# Patient Record
Sex: MALE | Race: WHITE | NOT HISPANIC OR LATINO | URBAN - METROPOLITAN AREA
[De-identification: names, ages, dates, MRNs, and addresses within clinical notes are randomized per-mention and may not be internally consistent; named-entity substitution may affect disease eponyms.]

---

## 2017-07-03 ENCOUNTER — INPATIENT (INPATIENT)
Facility: HOSPITAL | Age: 62
LOS: 2 days | Discharge: HOME | DRG: 392 | End: 2017-07-06
Attending: SURGERY | Admitting: SURGERY
Payer: COMMERCIAL

## 2017-07-03 VITALS
RESPIRATION RATE: 12 BRPM | TEMPERATURE: 98 F | SYSTOLIC BLOOD PRESSURE: 139 MMHG | DIASTOLIC BLOOD PRESSURE: 86 MMHG | WEIGHT: 173.06 LBS | OXYGEN SATURATION: 96 % | HEART RATE: 83 BPM

## 2017-07-03 DIAGNOSIS — K57.32 DIVERTICULITIS OF LARGE INTESTINE WITHOUT PERFORATION OR ABSCESS WITHOUT BLEEDING: ICD-10-CM

## 2017-07-03 LAB
ALBUMIN SERPL ELPH-MCNC: 3.9 G/DL — SIGNIFICANT CHANGE UP (ref 3.3–5)
ALP SERPL-CCNC: 99 U/L — SIGNIFICANT CHANGE UP (ref 40–120)
ALT FLD-CCNC: 32 U/L — SIGNIFICANT CHANGE UP (ref 12–78)
ANION GAP SERPL CALC-SCNC: 8 MMOL/L — SIGNIFICANT CHANGE UP (ref 5–17)
APPEARANCE UR: CLEAR — SIGNIFICANT CHANGE UP
AST SERPL-CCNC: 23 U/L — SIGNIFICANT CHANGE UP (ref 15–37)
BACTERIA # UR AUTO: ABNORMAL
BASOPHILS # BLD AUTO: 0.1 K/UL — SIGNIFICANT CHANGE UP (ref 0–0.2)
BASOPHILS NFR BLD AUTO: 0.6 % — SIGNIFICANT CHANGE UP (ref 0–2)
BILIRUB SERPL-MCNC: 1.1 MG/DL — SIGNIFICANT CHANGE UP (ref 0.2–1.2)
BILIRUB UR-MCNC: NEGATIVE — SIGNIFICANT CHANGE UP
BUN SERPL-MCNC: 13 MG/DL — SIGNIFICANT CHANGE UP (ref 7–23)
CALCIUM SERPL-MCNC: 9.1 MG/DL — SIGNIFICANT CHANGE UP (ref 8.5–10.1)
CHLORIDE SERPL-SCNC: 98 MMOL/L — SIGNIFICANT CHANGE UP (ref 96–108)
CO2 SERPL-SCNC: 32 MMOL/L — HIGH (ref 22–31)
COLOR SPEC: YELLOW — SIGNIFICANT CHANGE UP
CREAT SERPL-MCNC: 1 MG/DL — SIGNIFICANT CHANGE UP (ref 0.5–1.3)
DIFF PNL FLD: NEGATIVE — SIGNIFICANT CHANGE UP
EOSINOPHIL # BLD AUTO: 0.2 K/UL — SIGNIFICANT CHANGE UP (ref 0–0.5)
EOSINOPHIL NFR BLD AUTO: 2.1 % — SIGNIFICANT CHANGE UP (ref 0–6)
GLUCOSE SERPL-MCNC: 110 MG/DL — HIGH (ref 70–99)
GLUCOSE UR QL: NEGATIVE — SIGNIFICANT CHANGE UP
HCT VFR BLD CALC: 48.8 % — SIGNIFICANT CHANGE UP (ref 39–50)
HGB BLD-MCNC: 16.5 G/DL — SIGNIFICANT CHANGE UP (ref 13–17)
INR BLD: 1.21 RATIO — HIGH (ref 0.88–1.16)
KETONES UR-MCNC: NEGATIVE — SIGNIFICANT CHANGE UP
LEUKOCYTE ESTERASE UR-ACNC: ABNORMAL
LIDOCAIN IGE QN: 91 U/L — SIGNIFICANT CHANGE UP (ref 73–393)
LYMPHOCYTES # BLD AUTO: 1.6 K/UL — SIGNIFICANT CHANGE UP (ref 1–3.3)
LYMPHOCYTES # BLD AUTO: 13.6 % — SIGNIFICANT CHANGE UP (ref 13–44)
MCHC RBC-ENTMCNC: 31.9 PG — SIGNIFICANT CHANGE UP (ref 27–34)
MCHC RBC-ENTMCNC: 33.9 GM/DL — SIGNIFICANT CHANGE UP (ref 32–36)
MCV RBC AUTO: 94 FL — SIGNIFICANT CHANGE UP (ref 80–100)
MONOCYTES # BLD AUTO: 1 K/UL — HIGH (ref 0–0.9)
MONOCYTES NFR BLD AUTO: 8.7 % — SIGNIFICANT CHANGE UP (ref 1–9)
NEUTROPHILS # BLD AUTO: 8.9 K/UL — HIGH (ref 1.8–7.4)
NEUTROPHILS NFR BLD AUTO: 75.1 % — SIGNIFICANT CHANGE UP (ref 43–77)
NITRITE UR-MCNC: NEGATIVE — SIGNIFICANT CHANGE UP
PH UR: 7 — SIGNIFICANT CHANGE UP (ref 5–8)
PLATELET # BLD AUTO: 286 K/UL — SIGNIFICANT CHANGE UP (ref 150–400)
POTASSIUM SERPL-MCNC: 3.8 MMOL/L — SIGNIFICANT CHANGE UP (ref 3.5–5.3)
POTASSIUM SERPL-SCNC: 3.8 MMOL/L — SIGNIFICANT CHANGE UP (ref 3.5–5.3)
PROT SERPL-MCNC: 8.6 G/DL — HIGH (ref 6–8.3)
PROT UR-MCNC: NEGATIVE — SIGNIFICANT CHANGE UP
PROTHROM AB SERPL-ACNC: 13.2 SEC — HIGH (ref 9.8–12.7)
RBC # BLD: 5.19 M/UL — SIGNIFICANT CHANGE UP (ref 4.2–5.8)
RBC # FLD: 11.2 % — SIGNIFICANT CHANGE UP (ref 10.3–14.5)
SODIUM SERPL-SCNC: 138 MMOL/L — SIGNIFICANT CHANGE UP (ref 135–145)
SP GR SPEC: 1 — LOW (ref 1.01–1.02)
UROBILINOGEN FLD QL: NEGATIVE — SIGNIFICANT CHANGE UP
WBC # BLD: 11.9 K/UL — HIGH (ref 3.8–10.5)
WBC # FLD AUTO: 11.9 K/UL — HIGH (ref 3.8–10.5)
WBC UR QL: ABNORMAL

## 2017-07-03 PROCEDURE — 74177 CT ABD & PELVIS W/CONTRAST: CPT | Mod: 26

## 2017-07-03 PROCEDURE — 99285 EMERGENCY DEPT VISIT HI MDM: CPT

## 2017-07-03 RX ORDER — ONDANSETRON 8 MG/1
4 TABLET, FILM COATED ORAL EVERY 6 HOURS
Qty: 0 | Refills: 0 | Status: DISCONTINUED | OUTPATIENT
Start: 2017-07-03 | End: 2017-07-06

## 2017-07-03 RX ORDER — METRONIDAZOLE 500 MG
500 TABLET ORAL ONCE
Qty: 0 | Refills: 0 | Status: COMPLETED | OUTPATIENT
Start: 2017-07-03 | End: 2017-07-03

## 2017-07-03 RX ORDER — IOHEXOL 300 MG/ML
30 INJECTION, SOLUTION INTRAVENOUS ONCE
Qty: 0 | Refills: 0 | Status: COMPLETED | OUTPATIENT
Start: 2017-07-03 | End: 2017-07-03

## 2017-07-03 RX ORDER — SODIUM CHLORIDE 9 MG/ML
1000 INJECTION, SOLUTION INTRAVENOUS
Qty: 0 | Refills: 0 | Status: DISCONTINUED | OUTPATIENT
Start: 2017-07-03 | End: 2017-07-04

## 2017-07-03 RX ORDER — CIPROFLOXACIN LACTATE 400MG/40ML
400 VIAL (ML) INTRAVENOUS EVERY 12 HOURS
Qty: 0 | Refills: 0 | Status: DISCONTINUED | OUTPATIENT
Start: 2017-07-04 | End: 2017-07-05

## 2017-07-03 RX ORDER — CIPROFLOXACIN LACTATE 400MG/40ML
400 VIAL (ML) INTRAVENOUS ONCE
Qty: 0 | Refills: 0 | Status: COMPLETED | OUTPATIENT
Start: 2017-07-03 | End: 2017-07-03

## 2017-07-03 RX ORDER — MORPHINE SULFATE 50 MG/1
2 CAPSULE, EXTENDED RELEASE ORAL EVERY 4 HOURS
Qty: 0 | Refills: 0 | Status: DISCONTINUED | OUTPATIENT
Start: 2017-07-03 | End: 2017-07-06

## 2017-07-03 RX ORDER — METRONIDAZOLE 500 MG
500 TABLET ORAL EVERY 8 HOURS
Qty: 0 | Refills: 0 | Status: DISCONTINUED | OUTPATIENT
Start: 2017-07-04 | End: 2017-07-05

## 2017-07-03 RX ORDER — SODIUM CHLORIDE 9 MG/ML
1000 INJECTION INTRAMUSCULAR; INTRAVENOUS; SUBCUTANEOUS
Qty: 0 | Refills: 0 | Status: COMPLETED | OUTPATIENT
Start: 2017-07-03 | End: 2017-07-03

## 2017-07-03 RX ORDER — DIPHENHYDRAMINE HCL 50 MG
25 CAPSULE ORAL EVERY 6 HOURS
Qty: 0 | Refills: 0 | Status: DISCONTINUED | OUTPATIENT
Start: 2017-07-03 | End: 2017-07-06

## 2017-07-03 RX ADMIN — SODIUM CHLORIDE 1000 MILLILITER(S): 9 INJECTION INTRAMUSCULAR; INTRAVENOUS; SUBCUTANEOUS at 18:03

## 2017-07-03 RX ADMIN — Medication 200 MILLIGRAM(S): at 18:46

## 2017-07-03 RX ADMIN — IOHEXOL 30 MILLILITER(S): 300 INJECTION, SOLUTION INTRAVENOUS at 18:03

## 2017-07-03 RX ADMIN — SODIUM CHLORIDE 1000 MILLILITER(S): 9 INJECTION INTRAMUSCULAR; INTRAVENOUS; SUBCUTANEOUS at 19:36

## 2017-07-03 RX ADMIN — Medication 100 MILLIGRAM(S): at 18:45

## 2017-07-03 RX ADMIN — SODIUM CHLORIDE 100 MILLILITER(S): 9 INJECTION, SOLUTION INTRAVENOUS at 23:58

## 2017-07-03 NOTE — H&P ADULT - NSHPLABSRESULTS_GEN_ALL_CORE
16.5   11.9  )-----------( 286      ( 03 Jul 2017 18:16 )             48.8   07-03    138  |  98  |  13  ----------------------------<  110<H>  3.8   |  32<H>  |  1.00    Ca    9.1      03 Jul 2017 18:16    TPro  8.6<H>  /  Alb  3.9  /  TBili  1.1  /  DBili  x   /  AST  23  /  ALT  32  /  AlkPhos  99  07-03      CT sigmoid diverticulitis with microperforation

## 2017-07-03 NOTE — H&P ADULT - HISTORY OF PRESENT ILLNESS
pt is a 63 yo male with no significant medical hx presents with abdominal pain x 3-4 days.  PT states he was in his USOH until 4 days ago when he developed pain in his LLQ.  Pain was colicky and came around every 4 hrs, sharp in nature. No relieving or aggravating factors. Pain persisted and presented today with slight increase.  Denies any nausea/vomiting.  No fever/chills. Last BM this evening normal.  Never had colonoscopy

## 2017-07-03 NOTE — ED ADULT NURSE REASSESSMENT NOTE - NS ED NURSE REASSESS COMMENT FT1
Received patient from [facility]. CT scan +diverticulitis with microperfusion- pending GI surgeon consult. VSS. No c/o CP/SOB/N/V/D. No pain at this time

## 2017-07-03 NOTE — H&P ADULT - ASSESSMENT
63 yo male with diverticulitis with microperforation        -admit        -IV abx        -d/w pt will treat medically but if worsens poss surgical intervention needed        -needs outpt colonoscopy

## 2017-07-03 NOTE — ED PROVIDER NOTE - OBJECTIVE STATEMENT
62 male sent to ER by PMD for evaluation of abdominal pain. Patient states pain started 4 days ago, LLQ pain, non radiating, comes and goes, associated with loose stool, no vomiting, no diarrhea, no fever.

## 2017-07-04 LAB
ANION GAP SERPL CALC-SCNC: 7 MMOL/L — SIGNIFICANT CHANGE UP (ref 5–17)
BUN SERPL-MCNC: 12 MG/DL — SIGNIFICANT CHANGE UP (ref 7–23)
CALCIUM SERPL-MCNC: 8.8 MG/DL — SIGNIFICANT CHANGE UP (ref 8.5–10.1)
CHLORIDE SERPL-SCNC: 101 MMOL/L — SIGNIFICANT CHANGE UP (ref 96–108)
CO2 SERPL-SCNC: 31 MMOL/L — SIGNIFICANT CHANGE UP (ref 22–31)
CREAT SERPL-MCNC: 0.99 MG/DL — SIGNIFICANT CHANGE UP (ref 0.5–1.3)
GLUCOSE SERPL-MCNC: 146 MG/DL — HIGH (ref 70–99)
HCT VFR BLD CALC: 46.2 % — SIGNIFICANT CHANGE UP (ref 39–50)
HGB BLD-MCNC: 15.6 G/DL — SIGNIFICANT CHANGE UP (ref 13–17)
MAGNESIUM SERPL-MCNC: 2.1 MG/DL — SIGNIFICANT CHANGE UP (ref 1.6–2.6)
MCHC RBC-ENTMCNC: 31.9 PG — SIGNIFICANT CHANGE UP (ref 27–34)
MCHC RBC-ENTMCNC: 33.8 GM/DL — SIGNIFICANT CHANGE UP (ref 32–36)
MCV RBC AUTO: 94.5 FL — SIGNIFICANT CHANGE UP (ref 80–100)
PHOSPHATE SERPL-MCNC: 2.7 MG/DL — SIGNIFICANT CHANGE UP (ref 2.5–4.5)
PLATELET # BLD AUTO: 270 K/UL — SIGNIFICANT CHANGE UP (ref 150–400)
POTASSIUM SERPL-MCNC: 3.9 MMOL/L — SIGNIFICANT CHANGE UP (ref 3.5–5.3)
POTASSIUM SERPL-SCNC: 3.9 MMOL/L — SIGNIFICANT CHANGE UP (ref 3.5–5.3)
RBC # BLD: 4.89 M/UL — SIGNIFICANT CHANGE UP (ref 4.2–5.8)
RBC # FLD: 11.3 % — SIGNIFICANT CHANGE UP (ref 10.3–14.5)
SODIUM SERPL-SCNC: 139 MMOL/L — SIGNIFICANT CHANGE UP (ref 135–145)
WBC # BLD: 12.6 K/UL — HIGH (ref 3.8–10.5)
WBC # FLD AUTO: 12.6 K/UL — HIGH (ref 3.8–10.5)

## 2017-07-04 RX ORDER — ENOXAPARIN SODIUM 100 MG/ML
40 INJECTION SUBCUTANEOUS DAILY
Qty: 0 | Refills: 0 | Status: DISCONTINUED | OUTPATIENT
Start: 2017-07-04 | End: 2017-07-06

## 2017-07-04 RX ORDER — DEXTROSE MONOHYDRATE, SODIUM CHLORIDE, AND POTASSIUM CHLORIDE 50; .745; 4.5 G/1000ML; G/1000ML; G/1000ML
1000 INJECTION, SOLUTION INTRAVENOUS
Qty: 0 | Refills: 0 | Status: DISCONTINUED | OUTPATIENT
Start: 2017-07-04 | End: 2017-07-05

## 2017-07-04 RX ADMIN — DEXTROSE MONOHYDRATE, SODIUM CHLORIDE, AND POTASSIUM CHLORIDE 50 MILLILITER(S): 50; .745; 4.5 INJECTION, SOLUTION INTRAVENOUS at 15:49

## 2017-07-04 RX ADMIN — Medication 100 MILLIGRAM(S): at 02:00

## 2017-07-04 RX ADMIN — SODIUM CHLORIDE 100 MILLILITER(S): 9 INJECTION, SOLUTION INTRAVENOUS at 11:55

## 2017-07-04 RX ADMIN — DEXTROSE MONOHYDRATE, SODIUM CHLORIDE, AND POTASSIUM CHLORIDE 50 MILLILITER(S): 50; .745; 4.5 INJECTION, SOLUTION INTRAVENOUS at 15:52

## 2017-07-04 RX ADMIN — Medication 200 MILLIGRAM(S): at 17:43

## 2017-07-04 RX ADMIN — MORPHINE SULFATE 2 MILLIGRAM(S): 50 CAPSULE, EXTENDED RELEASE ORAL at 18:02

## 2017-07-04 RX ADMIN — Medication 100 MILLIGRAM(S): at 18:49

## 2017-07-04 RX ADMIN — Medication 200 MILLIGRAM(S): at 05:17

## 2017-07-04 RX ADMIN — Medication 25 MILLIGRAM(S): at 00:17

## 2017-07-04 RX ADMIN — MORPHINE SULFATE 2 MILLIGRAM(S): 50 CAPSULE, EXTENDED RELEASE ORAL at 17:47

## 2017-07-04 RX ADMIN — Medication 100 MILLIGRAM(S): at 11:55

## 2017-07-04 NOTE — PROGRESS NOTE ADULT - SUBJECTIVE AND OBJECTIVE BOX
Subjective: pt still with abdominal pain, without new complaints.    Objective:  Vital Signs Last 24 Hrs  T(C): 36.8 (04 Jul 2017 08:13), Max: 36.9 (03 Jul 2017 17:22)  T(F): 98.2 (04 Jul 2017 08:13), Max: 98.4 (03 Jul 2017 17:22)  HR: 68 (04 Jul 2017 08:13) (65 - 83)  BP: 129/80 (04 Jul 2017 08:13) (129/80 - 157/81)  BP(mean): --  RR: 17 (04 Jul 2017 08:13) (12 - 17)  SpO2: 96% (04 Jul 2017 08:13) (94% - 96%)    Heent: ZIGGY, FREOM  Pul: clear  Cor: RSR, without murmurs  Abdomen: Normal bowel sounds, without distension, with mild lower abdominal tenderness.  Extremities: without edema, motor/sensory intact, without calf pain, Homans sign negative.                        15.6   12.6  )-----------( 270      ( 04 Jul 2017 08:42 )             46.2       07-04    139  |  101  |  12  ----------------------------<  146<H>  3.9   |  31  |  0.99    Ca    8.8      04 Jul 2017 08:42  Phos  2.7     07-04  Mg     2.1     07-04    TPro  8.6<H>  /  Alb  3.9  /  TBili  1.1  /  DBili  x   /  AST  23  /  ALT  32  /  AlkPhos  99  07-03 07-03 @ 07:01  -  07-04 @ 07:00  --------------------------------------------------------  IN:    lactated ringers.: 500 mL    Solution: 200 mL    Solution: 100 mL  Total IN: 800 mL    OUT:    Voided: 450 mL  Total OUT: 450 mL    Total NET: 350 mL      07-04 @ 07:01  -  07-04 @ 13:33  --------------------------------------------------------  IN:  Total IN: 0 mL    OUT:    Voided: 700 mL  Total OUT: 700 mL    Total NET: -700 mL

## 2017-07-05 LAB
ANION GAP SERPL CALC-SCNC: 6 MMOL/L — SIGNIFICANT CHANGE UP (ref 5–17)
BUN SERPL-MCNC: 9 MG/DL — SIGNIFICANT CHANGE UP (ref 7–23)
CALCIUM SERPL-MCNC: 8.6 MG/DL — SIGNIFICANT CHANGE UP (ref 8.5–10.1)
CHLORIDE SERPL-SCNC: 102 MMOL/L — SIGNIFICANT CHANGE UP (ref 96–108)
CO2 SERPL-SCNC: 33 MMOL/L — HIGH (ref 22–31)
CREAT SERPL-MCNC: 1 MG/DL — SIGNIFICANT CHANGE UP (ref 0.5–1.3)
GLUCOSE SERPL-MCNC: 137 MG/DL — HIGH (ref 70–99)
HCT VFR BLD CALC: 43.8 % — SIGNIFICANT CHANGE UP (ref 39–50)
HGB BLD-MCNC: 14.8 G/DL — SIGNIFICANT CHANGE UP (ref 13–17)
MCHC RBC-ENTMCNC: 32.1 PG — SIGNIFICANT CHANGE UP (ref 27–34)
MCHC RBC-ENTMCNC: 33.9 GM/DL — SIGNIFICANT CHANGE UP (ref 32–36)
MCV RBC AUTO: 94.5 FL — SIGNIFICANT CHANGE UP (ref 80–100)
PLATELET # BLD AUTO: 251 K/UL — SIGNIFICANT CHANGE UP (ref 150–400)
POTASSIUM SERPL-MCNC: 4.5 MMOL/L — SIGNIFICANT CHANGE UP (ref 3.5–5.3)
POTASSIUM SERPL-SCNC: 4.5 MMOL/L — SIGNIFICANT CHANGE UP (ref 3.5–5.3)
RBC # BLD: 4.63 M/UL — SIGNIFICANT CHANGE UP (ref 4.2–5.8)
RBC # FLD: 11.3 % — SIGNIFICANT CHANGE UP (ref 10.3–14.5)
SODIUM SERPL-SCNC: 141 MMOL/L — SIGNIFICANT CHANGE UP (ref 135–145)
WBC # BLD: 11 K/UL — HIGH (ref 3.8–10.5)
WBC # FLD AUTO: 11 K/UL — HIGH (ref 3.8–10.5)

## 2017-07-05 RX ORDER — CIPROFLOXACIN LACTATE 400MG/40ML
500 VIAL (ML) INTRAVENOUS EVERY 12 HOURS
Qty: 0 | Refills: 0 | Status: DISCONTINUED | OUTPATIENT
Start: 2017-07-05 | End: 2017-07-06

## 2017-07-05 RX ORDER — METRONIDAZOLE 500 MG
500 TABLET ORAL EVERY 8 HOURS
Qty: 0 | Refills: 0 | Status: DISCONTINUED | OUTPATIENT
Start: 2017-07-05 | End: 2017-07-06

## 2017-07-05 RX ADMIN — Medication 500 MILLIGRAM(S): at 17:27

## 2017-07-05 RX ADMIN — Medication 100 MILLIGRAM(S): at 01:31

## 2017-07-05 RX ADMIN — MORPHINE SULFATE 2 MILLIGRAM(S): 50 CAPSULE, EXTENDED RELEASE ORAL at 02:00

## 2017-07-05 RX ADMIN — Medication 200 MILLIGRAM(S): at 05:16

## 2017-07-05 RX ADMIN — Medication 100 MILLIGRAM(S): at 10:29

## 2017-07-05 RX ADMIN — Medication 500 MILLIGRAM(S): at 22:04

## 2017-07-05 RX ADMIN — MORPHINE SULFATE 2 MILLIGRAM(S): 50 CAPSULE, EXTENDED RELEASE ORAL at 01:36

## 2017-07-05 RX ADMIN — Medication 500 MILLIGRAM(S): at 13:00

## 2017-07-05 NOTE — PROGRESS NOTE ADULT - SUBJECTIVE AND OBJECTIVE BOX
pt seen  feeling better  tolerating liquids  ICU Vital Signs Last 24 Hrs  T(C): 36.6 (05 Jul 2017 07:52), Max: 36.9 (04 Jul 2017 16:21)  T(F): 97.8 (05 Jul 2017 07:52), Max: 98.5 (04 Jul 2017 16:21)  HR: 64 (05 Jul 2017 07:52) (64 - 73)  BP: 127/77 (05 Jul 2017 07:52) (110/71 - 127/77)  BP(mean): --  ABP: --  ABP(mean): --  RR: 17 (05 Jul 2017 07:52) (16 - 17)  SpO2: 97% (05 Jul 2017 07:52) (97% - 97%)  NAD  soft NT/ND      63 yo with diverticulitis.  Improving     regular diet      d/c ivf       po abx

## 2017-07-06 VITALS
RESPIRATION RATE: 16 BRPM | SYSTOLIC BLOOD PRESSURE: 143 MMHG | HEART RATE: 63 BPM | TEMPERATURE: 98 F | OXYGEN SATURATION: 97 % | DIASTOLIC BLOOD PRESSURE: 81 MMHG

## 2017-07-06 PROCEDURE — 36415 COLL VENOUS BLD VENIPUNCTURE: CPT

## 2017-07-06 PROCEDURE — 80053 COMPREHEN METABOLIC PANEL: CPT

## 2017-07-06 PROCEDURE — 80048 BASIC METABOLIC PNL TOTAL CA: CPT

## 2017-07-06 PROCEDURE — 99285 EMERGENCY DEPT VISIT HI MDM: CPT | Mod: 25

## 2017-07-06 PROCEDURE — 85610 PROTHROMBIN TIME: CPT

## 2017-07-06 PROCEDURE — 74177 CT ABD & PELVIS W/CONTRAST: CPT

## 2017-07-06 PROCEDURE — 84100 ASSAY OF PHOSPHORUS: CPT

## 2017-07-06 PROCEDURE — 96365 THER/PROPH/DIAG IV INF INIT: CPT

## 2017-07-06 PROCEDURE — 96366 THER/PROPH/DIAG IV INF ADDON: CPT

## 2017-07-06 PROCEDURE — 85027 COMPLETE CBC AUTOMATED: CPT

## 2017-07-06 PROCEDURE — 83735 ASSAY OF MAGNESIUM: CPT

## 2017-07-06 PROCEDURE — 83690 ASSAY OF LIPASE: CPT

## 2017-07-06 PROCEDURE — 81001 URINALYSIS AUTO W/SCOPE: CPT

## 2017-07-06 PROCEDURE — G0378: CPT

## 2017-07-06 PROCEDURE — 96367 TX/PROPH/DG ADDL SEQ IV INF: CPT

## 2017-07-06 RX ADMIN — Medication 500 MILLIGRAM(S): at 05:14

## 2017-07-11 DIAGNOSIS — K57.20 DIVERTICULITIS OF LARGE INTESTINE WITH PERFORATION AND ABSCESS WITHOUT BLEEDING: ICD-10-CM

## 2017-07-11 DIAGNOSIS — E78.5 HYPERLIPIDEMIA, UNSPECIFIED: ICD-10-CM

## 2017-08-21 RX ORDER — ATORVASTATIN CALCIUM 80 MG/1
10 TABLET, FILM COATED ORAL AT BEDTIME
Qty: 0 | Refills: 0 | Status: ACTIVE | OUTPATIENT
Start: 2017-08-21 | End: 2018-07-20

## 2017-08-21 NOTE — DISCHARGE NOTE ADULT - CARE PROVIDER_API CALL
Papo Rich (MD), Surgery  [address] [facility]  [address] [place] [address]  Phone: [phone]  Fax: [phone]

## 2017-08-21 NOTE — DISCHARGE NOTE ADULT - CARE PLAN
Principal Discharge DX:	Diverticulitis of sigmoid colon  Goal:	infection free  Instructions for follow-up, activity and diet:	f/u with GI and PMD

## 2017-08-21 NOTE — DISCHARGE NOTE ADULT - PATIENT PORTAL LINK FT
“You can access the [facility] Patient Portal, offered by [facility], by registering with the following website: [email or web address]”

## 2017-08-21 NOTE — DISCHARGE NOTE ADULT - HOSPITAL COURSE
PT was admitted with acute diverticulitis.  Did well with antibiotics and D/C home on PO meds and tolerating diet

## 2018-09-07 ENCOUNTER — EMERGENCY (EMERGENCY)
Facility: HOSPITAL | Age: 63
LOS: 1 days | Discharge: HOME | End: 2018-09-07
Attending: EMERGENCY MEDICINE
Payer: COMMERCIAL

## 2018-09-07 VITALS
RESPIRATION RATE: 16 BRPM | DIASTOLIC BLOOD PRESSURE: 82 MMHG | OXYGEN SATURATION: 98 % | HEART RATE: 73 BPM | TEMPERATURE: 98 F | SYSTOLIC BLOOD PRESSURE: 144 MMHG

## 2018-09-07 VITALS
WEIGHT: 169.98 LBS | RESPIRATION RATE: 16 BRPM | HEIGHT: 67 IN | OXYGEN SATURATION: 97 % | DIASTOLIC BLOOD PRESSURE: 92 MMHG | TEMPERATURE: 98 F | HEART RATE: 81 BPM | SYSTOLIC BLOOD PRESSURE: 182 MMHG

## 2018-09-07 LAB
ANION GAP SERPL CALC-SCNC: 9 MMOL/L — SIGNIFICANT CHANGE UP (ref 5–17)
APTT BLD: 32 SEC — SIGNIFICANT CHANGE UP (ref 27.5–37.4)
BASOPHILS # BLD AUTO: 0.05 K/UL — SIGNIFICANT CHANGE UP (ref 0–0.2)
BASOPHILS NFR BLD AUTO: 0.7 % — SIGNIFICANT CHANGE UP (ref 0–2)
BUN SERPL-MCNC: 11 MG/DL — SIGNIFICANT CHANGE UP (ref 7–23)
CALCIUM SERPL-MCNC: 8.9 MG/DL — SIGNIFICANT CHANGE UP (ref 8.5–10.1)
CHLORIDE SERPL-SCNC: 102 MMOL/L — SIGNIFICANT CHANGE UP (ref 96–108)
CK MB CFR SERPL CALC: 1 NG/ML — SIGNIFICANT CHANGE UP (ref 0–3.6)
CO2 SERPL-SCNC: 29 MMOL/L — SIGNIFICANT CHANGE UP (ref 22–31)
CREAT SERPL-MCNC: 0.99 MG/DL — SIGNIFICANT CHANGE UP (ref 0.5–1.3)
EOSINOPHIL # BLD AUTO: 0.23 K/UL — SIGNIFICANT CHANGE UP (ref 0–0.5)
EOSINOPHIL NFR BLD AUTO: 3.1 % — SIGNIFICANT CHANGE UP (ref 0–6)
GLUCOSE SERPL-MCNC: 272 MG/DL — HIGH (ref 70–99)
HCT VFR BLD CALC: 44.6 % — SIGNIFICANT CHANGE UP (ref 39–50)
HGB BLD-MCNC: 15.2 G/DL — SIGNIFICANT CHANGE UP (ref 13–17)
IMM GRANULOCYTES NFR BLD AUTO: 0.5 % — SIGNIFICANT CHANGE UP (ref 0–1.5)
INR BLD: 1.03 RATIO — SIGNIFICANT CHANGE UP (ref 0.88–1.16)
LYMPHOCYTES # BLD AUTO: 1.69 K/UL — SIGNIFICANT CHANGE UP (ref 1–3.3)
LYMPHOCYTES # BLD AUTO: 23.1 % — SIGNIFICANT CHANGE UP (ref 13–44)
MAGNESIUM SERPL-MCNC: 2.2 MG/DL — SIGNIFICANT CHANGE UP (ref 1.6–2.6)
MCHC RBC-ENTMCNC: 30.6 PG — SIGNIFICANT CHANGE UP (ref 27–34)
MCHC RBC-ENTMCNC: 34.1 GM/DL — SIGNIFICANT CHANGE UP (ref 32–36)
MCV RBC AUTO: 89.9 FL — SIGNIFICANT CHANGE UP (ref 80–100)
MONOCYTES # BLD AUTO: 0.67 K/UL — SIGNIFICANT CHANGE UP (ref 0–0.9)
MONOCYTES NFR BLD AUTO: 9.1 % — SIGNIFICANT CHANGE UP (ref 2–14)
NEUTROPHILS # BLD AUTO: 4.65 K/UL — SIGNIFICANT CHANGE UP (ref 1.8–7.4)
NEUTROPHILS NFR BLD AUTO: 63.5 % — SIGNIFICANT CHANGE UP (ref 43–77)
PLATELET # BLD AUTO: 221 K/UL — SIGNIFICANT CHANGE UP (ref 150–400)
POTASSIUM SERPL-MCNC: 4.1 MMOL/L — SIGNIFICANT CHANGE UP (ref 3.5–5.3)
POTASSIUM SERPL-SCNC: 4.1 MMOL/L — SIGNIFICANT CHANGE UP (ref 3.5–5.3)
PROTHROM AB SERPL-ACNC: 11.2 SEC — SIGNIFICANT CHANGE UP (ref 9.8–12.7)
RBC # BLD: 4.96 M/UL — SIGNIFICANT CHANGE UP (ref 4.2–5.8)
RBC # FLD: 11.9 % — SIGNIFICANT CHANGE UP (ref 10.3–14.5)
SODIUM SERPL-SCNC: 140 MMOL/L — SIGNIFICANT CHANGE UP (ref 135–145)
TROPONIN I SERPL-MCNC: <.015 NG/ML — SIGNIFICANT CHANGE UP (ref 0.01–0.04)
WBC # BLD: 7.33 K/UL — SIGNIFICANT CHANGE UP (ref 3.8–10.5)
WBC # FLD AUTO: 7.33 K/UL — SIGNIFICANT CHANGE UP (ref 3.8–10.5)

## 2018-09-07 PROCEDURE — 36415 COLL VENOUS BLD VENIPUNCTURE: CPT

## 2018-09-07 PROCEDURE — 80048 BASIC METABOLIC PNL TOTAL CA: CPT

## 2018-09-07 PROCEDURE — 85610 PROTHROMBIN TIME: CPT

## 2018-09-07 PROCEDURE — 99284 EMERGENCY DEPT VISIT MOD MDM: CPT | Mod: 25

## 2018-09-07 PROCEDURE — 85027 COMPLETE CBC AUTOMATED: CPT

## 2018-09-07 PROCEDURE — 96360 HYDRATION IV INFUSION INIT: CPT

## 2018-09-07 PROCEDURE — 71045 X-RAY EXAM CHEST 1 VIEW: CPT

## 2018-09-07 PROCEDURE — 70450 CT HEAD/BRAIN W/O DYE: CPT | Mod: 26

## 2018-09-07 PROCEDURE — 99285 EMERGENCY DEPT VISIT HI MDM: CPT

## 2018-09-07 PROCEDURE — 83735 ASSAY OF MAGNESIUM: CPT

## 2018-09-07 PROCEDURE — 70450 CT HEAD/BRAIN W/O DYE: CPT

## 2018-09-07 PROCEDURE — 85730 THROMBOPLASTIN TIME PARTIAL: CPT

## 2018-09-07 PROCEDURE — 71045 X-RAY EXAM CHEST 1 VIEW: CPT | Mod: 26

## 2018-09-07 PROCEDURE — 82553 CREATINE MB FRACTION: CPT

## 2018-09-07 PROCEDURE — 84484 ASSAY OF TROPONIN QUANT: CPT

## 2018-09-07 RX ORDER — SODIUM CHLORIDE 9 MG/ML
1000 INJECTION INTRAMUSCULAR; INTRAVENOUS; SUBCUTANEOUS ONCE
Qty: 0 | Refills: 0 | Status: COMPLETED | OUTPATIENT
Start: 2018-09-07 | End: 2018-09-07

## 2018-09-07 RX ADMIN — SODIUM CHLORIDE 1000 MILLILITER(S): 9 INJECTION INTRAMUSCULAR; INTRAVENOUS; SUBCUTANEOUS at 18:47

## 2018-09-07 RX ADMIN — SODIUM CHLORIDE 1000 MILLILITER(S): 9 INJECTION INTRAMUSCULAR; INTRAVENOUS; SUBCUTANEOUS at 19:47

## 2018-09-07 NOTE — ED PROVIDER NOTE - PROGRESS NOTE DETAILS
ekg ns, nonischemic labs ct xr unremarkable, pt to f/u w/ pcp/cardio/neuro for further eval and mgmt

## 2018-09-07 NOTE — ED PROVIDER NOTE - MEDICAL DECISION MAKING DETAILS
62yo M h/o pre-dm, hld p/w intemittent episodic lightheadedness associated w/ bilateral UE numbness 3 episode over 1 month. denies f/c/n/v/d/visual changes/cp/sob/palpitations.

## 2018-09-07 NOTE — ED ADULT NURSE NOTE - OBJECTIVE STATEMENT
patient came in ED from home, with c/o period of lightheadedness and numbness of both arms that lasted for 3 min. alert and oriented X 4. non-labored respiration noted. denies headache. no facial droop. no arm drift. no slurred speech. DR Pollack s/e pt at the bedside.

## 2018-09-07 NOTE — ED PROVIDER NOTE - NS ED ROS FT
GEN: no fever, no chills, no weakness  HENT: no eye pain, no visual changes, no ear pain, no visual or hearing changes, no sore throat, no swelling or neck pain  CV: no chest pain, no palpitations, +dizziness, no swelling  RESP: no coughing, no sob, no IWOB, no NOE  GI: no abd pain, no distension, no nausea, no vomiting, no diarrhea, no constipation  : no dysuria,  no frequency, no hematuria, no discharge, no flank pain  MUSCULOSKELETAL: no myalgia, no arthralgia, no joint swelling, no bruising   SKIN: no rash, no wounds, no itching  NEURO: no change in mentation, no visual changes, no HA, no focal weakness, no trouble speaking, no gait abnormalities, no dizziness, +b/ l UE numbness   PSYCH: no suidical ideation, no homicidal ideation, no depression, no anxiety, no hallucinations

## 2018-09-07 NOTE — ED PROVIDER NOTE - PHYSICAL EXAMINATION
GEN: awake, alert, well appearing, NAD   HENT: atraumatic, normocephalic, ZIGGY, EOMI, no midline instability, oropharynx w/o erythema or exudates, no lymphadenopathy  CV: normal rate and rhythm, S1, S2, no MRG, equal pulses throughout, no JVD  RESP: no distress, no IWOB, no retraction, clear to auscultation bilaterally   ABD: soft, nontender, nondistended, no rebound, no guarding, normoactive bowel sounds, no organomegally  MUSCULOSKELETAL: strenght 5/5 x 4, full range of motion, CMS intact   SKIN: normal color, no turgor, no wounds or rash   NEURO: Awake alert oriented x 3, no facial asymmetry, no slurred speech, no pronator drift, moving all extremities  PSYCH: no suicial ideation, no homicidal ideation, no depression, no anxiety, no hallucination

## 2018-09-07 NOTE — ED PROVIDER NOTE - OBJECTIVE STATEMENT
64yo M h/o pre-dm, hld p/w intemittent episodic lightheadedness associated w/ bilateral UE numbness 3 episode over 1 month. denies f/c/n/v/d/visual changes/cp/sob/palpitations.

## 2018-09-07 NOTE — ED ADULT NURSE NOTE - NSIMPLEMENTINTERV_GEN_ALL_ED
Implemented All Universal Safety Interventions:  [place] to call system. Call bell, personal items and telephone within reach. Instruct patient to call for assistance. Room bathroom lighting operational. Non-slip footwear when patient is off stretcher. Physically safe environment: no spills, clutter or unnecessary equipment. Stretcher in lowest position, wheels locked, appropriate side rails in place.

## 2018-12-21 ENCOUNTER — INPATIENT (INPATIENT)
Facility: HOSPITAL | Age: 63
LOS: 1 days | Discharge: HOME | DRG: 392 | End: 2018-12-23
Attending: HOSPITALIST | Admitting: HOSPITALIST
Payer: COMMERCIAL

## 2018-12-21 VITALS
HEART RATE: 80 BPM | HEIGHT: 66 IN | TEMPERATURE: 98 F | SYSTOLIC BLOOD PRESSURE: 154 MMHG | WEIGHT: 169.09 LBS | RESPIRATION RATE: 14 BRPM | DIASTOLIC BLOOD PRESSURE: 84 MMHG | OXYGEN SATURATION: 100 %

## 2018-12-21 DIAGNOSIS — K57.92 DIVERTICULITIS OF INTESTINE, PART UNSPECIFIED, WITHOUT PERFORATION OR ABSCESS WITHOUT BLEEDING: ICD-10-CM

## 2018-12-21 LAB
ALBUMIN SERPL ELPH-MCNC: 4.3 G/DL — SIGNIFICANT CHANGE UP (ref 3.3–5)
ALP SERPL-CCNC: 80 U/L — SIGNIFICANT CHANGE UP (ref 40–120)
ALT FLD-CCNC: 41 U/L — SIGNIFICANT CHANGE UP (ref 12–78)
ANION GAP SERPL CALC-SCNC: 7 MMOL/L — SIGNIFICANT CHANGE UP (ref 5–17)
APPEARANCE UR: CLEAR — SIGNIFICANT CHANGE UP
APTT BLD: 35.5 SEC — SIGNIFICANT CHANGE UP (ref 27.5–36.3)
AST SERPL-CCNC: 21 U/L — SIGNIFICANT CHANGE UP (ref 15–37)
BASOPHILS # BLD AUTO: 0.04 K/UL — SIGNIFICANT CHANGE UP (ref 0–0.2)
BASOPHILS NFR BLD AUTO: 0.3 % — SIGNIFICANT CHANGE UP (ref 0–2)
BILIRUB SERPL-MCNC: 1.5 MG/DL — HIGH (ref 0.2–1.2)
BILIRUB UR-MCNC: NEGATIVE — SIGNIFICANT CHANGE UP
BLD GP AB SCN SERPL QL: SIGNIFICANT CHANGE UP
BUN SERPL-MCNC: 15 MG/DL — SIGNIFICANT CHANGE UP (ref 7–23)
CALCIUM SERPL-MCNC: 9.6 MG/DL — SIGNIFICANT CHANGE UP (ref 8.5–10.1)
CHLORIDE SERPL-SCNC: 100 MMOL/L — SIGNIFICANT CHANGE UP (ref 96–108)
CO2 SERPL-SCNC: 31 MMOL/L — SIGNIFICANT CHANGE UP (ref 22–31)
COLOR SPEC: YELLOW — SIGNIFICANT CHANGE UP
CREAT SERPL-MCNC: 1.1 MG/DL — SIGNIFICANT CHANGE UP (ref 0.5–1.3)
DIFF PNL FLD: NEGATIVE — SIGNIFICANT CHANGE UP
EOSINOPHIL # BLD AUTO: 0.29 K/UL — SIGNIFICANT CHANGE UP (ref 0–0.5)
EOSINOPHIL NFR BLD AUTO: 2.3 % — SIGNIFICANT CHANGE UP (ref 0–6)
GLUCOSE SERPL-MCNC: 92 MG/DL — SIGNIFICANT CHANGE UP (ref 70–99)
GLUCOSE UR QL: NEGATIVE — SIGNIFICANT CHANGE UP
HCT VFR BLD CALC: 50.7 % — HIGH (ref 39–50)
HGB BLD-MCNC: 17 G/DL — SIGNIFICANT CHANGE UP (ref 13–17)
IMM GRANULOCYTES NFR BLD AUTO: 0.4 % — SIGNIFICANT CHANGE UP (ref 0–1.5)
INR BLD: 1.22 RATIO — HIGH (ref 0.88–1.16)
KETONES UR-MCNC: ABNORMAL
LACTATE SERPL-SCNC: 1.4 MMOL/L — SIGNIFICANT CHANGE UP (ref 0.7–2)
LACTATE SERPL-SCNC: 2.3 MMOL/L — HIGH (ref 0.7–2)
LEUKOCYTE ESTERASE UR-ACNC: NEGATIVE — SIGNIFICANT CHANGE UP
LIDOCAIN IGE QN: 145 U/L — SIGNIFICANT CHANGE UP (ref 73–393)
LYMPHOCYTES # BLD AUTO: 1.77 K/UL — SIGNIFICANT CHANGE UP (ref 1–3.3)
LYMPHOCYTES # BLD AUTO: 14.1 % — SIGNIFICANT CHANGE UP (ref 13–44)
MCHC RBC-ENTMCNC: 30.4 PG — SIGNIFICANT CHANGE UP (ref 27–34)
MCHC RBC-ENTMCNC: 33.5 GM/DL — SIGNIFICANT CHANGE UP (ref 32–36)
MCV RBC AUTO: 90.5 FL — SIGNIFICANT CHANGE UP (ref 80–100)
MONOCYTES # BLD AUTO: 1.16 K/UL — HIGH (ref 0–0.9)
MONOCYTES NFR BLD AUTO: 9.3 % — SIGNIFICANT CHANGE UP (ref 2–14)
NEUTROPHILS # BLD AUTO: 9.23 K/UL — HIGH (ref 1.8–7.4)
NEUTROPHILS NFR BLD AUTO: 73.6 % — SIGNIFICANT CHANGE UP (ref 43–77)
NITRITE UR-MCNC: NEGATIVE — SIGNIFICANT CHANGE UP
NRBC # BLD: 0 /100 WBCS — SIGNIFICANT CHANGE UP (ref 0–0)
PH UR: 7 — SIGNIFICANT CHANGE UP (ref 5–8)
PLATELET # BLD AUTO: 243 K/UL — SIGNIFICANT CHANGE UP (ref 150–400)
POTASSIUM SERPL-MCNC: 4.2 MMOL/L — SIGNIFICANT CHANGE UP (ref 3.5–5.3)
POTASSIUM SERPL-SCNC: 4.2 MMOL/L — SIGNIFICANT CHANGE UP (ref 3.5–5.3)
PROT SERPL-MCNC: 8.6 G/DL — HIGH (ref 6–8.3)
PROT UR-MCNC: NEGATIVE — SIGNIFICANT CHANGE UP
PROTHROM AB SERPL-ACNC: 13.9 SEC — HIGH (ref 10–12.9)
RBC # BLD: 5.6 M/UL — SIGNIFICANT CHANGE UP (ref 4.2–5.8)
RBC # FLD: 12.1 % — SIGNIFICANT CHANGE UP (ref 10.3–14.5)
SODIUM SERPL-SCNC: 138 MMOL/L — SIGNIFICANT CHANGE UP (ref 135–145)
SP GR SPEC: 1 — LOW (ref 1.01–1.02)
UROBILINOGEN FLD QL: NEGATIVE — SIGNIFICANT CHANGE UP
WBC # BLD: 12.54 K/UL — HIGH (ref 3.8–10.5)
WBC # FLD AUTO: 12.54 K/UL — HIGH (ref 3.8–10.5)

## 2018-12-21 PROCEDURE — 74177 CT ABD & PELVIS W/CONTRAST: CPT | Mod: 26

## 2018-12-21 PROCEDURE — 93010 ELECTROCARDIOGRAM REPORT: CPT

## 2018-12-21 PROCEDURE — 71046 X-RAY EXAM CHEST 2 VIEWS: CPT | Mod: 26

## 2018-12-21 PROCEDURE — 99285 EMERGENCY DEPT VISIT HI MDM: CPT

## 2018-12-21 PROCEDURE — 99223 1ST HOSP IP/OBS HIGH 75: CPT | Mod: AI

## 2018-12-21 RX ORDER — ATORVASTATIN CALCIUM 80 MG/1
0 TABLET, FILM COATED ORAL
Qty: 0 | Refills: 0 | COMMUNITY

## 2018-12-21 RX ORDER — KETOROLAC TROMETHAMINE 30 MG/ML
30 SYRINGE (ML) INJECTION ONCE
Qty: 0 | Refills: 0 | Status: DISCONTINUED | OUTPATIENT
Start: 2018-12-21 | End: 2018-12-21

## 2018-12-21 RX ORDER — CIPROFLOXACIN LACTATE 400MG/40ML
VIAL (ML) INTRAVENOUS
Qty: 0 | Refills: 0 | Status: DISCONTINUED | OUTPATIENT
Start: 2018-12-22 | End: 2018-12-23

## 2018-12-21 RX ORDER — RAMIPRIL 5 MG
0 CAPSULE ORAL
Qty: 0 | Refills: 0 | COMMUNITY

## 2018-12-21 RX ORDER — CIPROFLOXACIN LACTATE 400MG/40ML
400 VIAL (ML) INTRAVENOUS EVERY 12 HOURS
Qty: 0 | Refills: 0 | Status: DISCONTINUED | OUTPATIENT
Start: 2018-12-22 | End: 2018-12-23

## 2018-12-21 RX ORDER — ONDANSETRON 8 MG/1
4 TABLET, FILM COATED ORAL EVERY 6 HOURS
Qty: 0 | Refills: 0 | Status: DISCONTINUED | OUTPATIENT
Start: 2018-12-21 | End: 2018-12-23

## 2018-12-21 RX ORDER — SODIUM CHLORIDE 9 MG/ML
1000 INJECTION INTRAMUSCULAR; INTRAVENOUS; SUBCUTANEOUS
Qty: 0 | Refills: 0 | Status: COMPLETED | OUTPATIENT
Start: 2018-12-21 | End: 2018-12-21

## 2018-12-21 RX ORDER — MORPHINE SULFATE 50 MG/1
2 CAPSULE, EXTENDED RELEASE ORAL EVERY 6 HOURS
Qty: 0 | Refills: 0 | Status: DISCONTINUED | OUTPATIENT
Start: 2018-12-21 | End: 2018-12-22

## 2018-12-21 RX ORDER — LACTOBACILLUS ACIDOPHILUS 100MM CELL
1 CAPSULE ORAL
Qty: 0 | Refills: 0 | Status: DISCONTINUED | OUTPATIENT
Start: 2018-12-21 | End: 2018-12-23

## 2018-12-21 RX ORDER — GLUCAGON INJECTION, SOLUTION 0.5 MG/.1ML
1 INJECTION, SOLUTION SUBCUTANEOUS ONCE
Qty: 0 | Refills: 0 | Status: DISCONTINUED | OUTPATIENT
Start: 2018-12-21 | End: 2018-12-23

## 2018-12-21 RX ORDER — DEXTROSE 50 % IN WATER 50 %
12.5 SYRINGE (ML) INTRAVENOUS ONCE
Qty: 0 | Refills: 0 | Status: DISCONTINUED | OUTPATIENT
Start: 2018-12-21 | End: 2018-12-23

## 2018-12-21 RX ORDER — ONDANSETRON 8 MG/1
4 TABLET, FILM COATED ORAL ONCE
Qty: 0 | Refills: 0 | Status: COMPLETED | OUTPATIENT
Start: 2018-12-21 | End: 2018-12-21

## 2018-12-21 RX ORDER — LISINOPRIL 2.5 MG/1
20 TABLET ORAL DAILY
Qty: 0 | Refills: 0 | Status: DISCONTINUED | OUTPATIENT
Start: 2018-12-21 | End: 2018-12-23

## 2018-12-21 RX ORDER — SODIUM CHLORIDE 9 MG/ML
3 INJECTION INTRAMUSCULAR; INTRAVENOUS; SUBCUTANEOUS ONCE
Qty: 0 | Refills: 0 | Status: COMPLETED | OUTPATIENT
Start: 2018-12-21 | End: 2018-12-21

## 2018-12-21 RX ORDER — GLIMEPIRIDE 1 MG
0 TABLET ORAL
Qty: 0 | Refills: 0 | COMMUNITY

## 2018-12-21 RX ORDER — METRONIDAZOLE 500 MG
500 TABLET ORAL ONCE
Qty: 0 | Refills: 0 | Status: COMPLETED | OUTPATIENT
Start: 2018-12-21 | End: 2018-12-21

## 2018-12-21 RX ORDER — SODIUM CHLORIDE 9 MG/ML
1000 INJECTION INTRAMUSCULAR; INTRAVENOUS; SUBCUTANEOUS ONCE
Qty: 0 | Refills: 0 | Status: COMPLETED | OUTPATIENT
Start: 2018-12-21 | End: 2018-12-21

## 2018-12-21 RX ORDER — ATORVASTATIN CALCIUM 80 MG/1
10 TABLET, FILM COATED ORAL AT BEDTIME
Qty: 0 | Refills: 0 | Status: DISCONTINUED | OUTPATIENT
Start: 2018-12-21 | End: 2018-12-23

## 2018-12-21 RX ORDER — METRONIDAZOLE 500 MG
TABLET ORAL
Qty: 0 | Refills: 0 | Status: DISCONTINUED | OUTPATIENT
Start: 2018-12-21 | End: 2018-12-23

## 2018-12-21 RX ORDER — IOHEXOL 300 MG/ML
30 INJECTION, SOLUTION INTRAVENOUS ONCE
Qty: 0 | Refills: 0 | Status: COMPLETED | OUTPATIENT
Start: 2018-12-21 | End: 2018-12-21

## 2018-12-21 RX ORDER — PIPERACILLIN AND TAZOBACTAM 4; .5 G/20ML; G/20ML
3.38 INJECTION, POWDER, LYOPHILIZED, FOR SOLUTION INTRAVENOUS ONCE
Qty: 0 | Refills: 0 | Status: COMPLETED | OUTPATIENT
Start: 2018-12-21 | End: 2018-12-21

## 2018-12-21 RX ORDER — INSULIN LISPRO 100/ML
VIAL (ML) SUBCUTANEOUS
Qty: 0 | Refills: 0 | Status: DISCONTINUED | OUTPATIENT
Start: 2018-12-21 | End: 2018-12-23

## 2018-12-21 RX ORDER — METRONIDAZOLE 500 MG
500 TABLET ORAL EVERY 8 HOURS
Qty: 0 | Refills: 0 | Status: DISCONTINUED | OUTPATIENT
Start: 2018-12-22 | End: 2018-12-23

## 2018-12-21 RX ORDER — DEXTROSE 50 % IN WATER 50 %
15 SYRINGE (ML) INTRAVENOUS ONCE
Qty: 0 | Refills: 0 | Status: DISCONTINUED | OUTPATIENT
Start: 2018-12-21 | End: 2018-12-23

## 2018-12-21 RX ORDER — ACETAMINOPHEN 500 MG
650 TABLET ORAL EVERY 6 HOURS
Qty: 0 | Refills: 0 | Status: DISCONTINUED | OUTPATIENT
Start: 2018-12-21 | End: 2018-12-23

## 2018-12-21 RX ORDER — MORPHINE SULFATE 50 MG/1
4 CAPSULE, EXTENDED RELEASE ORAL ONCE
Qty: 0 | Refills: 0 | Status: DISCONTINUED | OUTPATIENT
Start: 2018-12-21 | End: 2018-12-21

## 2018-12-21 RX ORDER — SODIUM CHLORIDE 9 MG/ML
1000 INJECTION, SOLUTION INTRAVENOUS
Qty: 0 | Refills: 0 | Status: DISCONTINUED | OUTPATIENT
Start: 2018-12-21 | End: 2018-12-23

## 2018-12-21 RX ORDER — CIPROFLOXACIN LACTATE 400MG/40ML
400 VIAL (ML) INTRAVENOUS ONCE
Qty: 0 | Refills: 0 | Status: COMPLETED | OUTPATIENT
Start: 2018-12-21 | End: 2018-12-22

## 2018-12-21 RX ORDER — SODIUM CHLORIDE 9 MG/ML
1000 INJECTION INTRAMUSCULAR; INTRAVENOUS; SUBCUTANEOUS
Qty: 0 | Refills: 0 | Status: DISCONTINUED | OUTPATIENT
Start: 2018-12-21 | End: 2018-12-23

## 2018-12-21 RX ADMIN — SODIUM CHLORIDE 1000 MILLILITER(S): 9 INJECTION INTRAMUSCULAR; INTRAVENOUS; SUBCUTANEOUS at 19:23

## 2018-12-21 RX ADMIN — SODIUM CHLORIDE 1000 MILLILITER(S): 9 INJECTION INTRAMUSCULAR; INTRAVENOUS; SUBCUTANEOUS at 19:30

## 2018-12-21 RX ADMIN — SODIUM CHLORIDE 1000 MILLILITER(S): 9 INJECTION INTRAMUSCULAR; INTRAVENOUS; SUBCUTANEOUS at 20:25

## 2018-12-21 RX ADMIN — SODIUM CHLORIDE 1000 MILLILITER(S): 9 INJECTION INTRAMUSCULAR; INTRAVENOUS; SUBCUTANEOUS at 18:30

## 2018-12-21 RX ADMIN — MORPHINE SULFATE 2 MILLIGRAM(S): 50 CAPSULE, EXTENDED RELEASE ORAL at 23:14

## 2018-12-21 RX ADMIN — MORPHINE SULFATE 4 MILLIGRAM(S): 50 CAPSULE, EXTENDED RELEASE ORAL at 17:36

## 2018-12-21 RX ADMIN — SODIUM CHLORIDE 3 MILLILITER(S): 9 INJECTION INTRAMUSCULAR; INTRAVENOUS; SUBCUTANEOUS at 17:04

## 2018-12-21 RX ADMIN — Medication 30 MILLIGRAM(S): at 18:34

## 2018-12-21 RX ADMIN — SODIUM CHLORIDE 1000 MILLILITER(S): 9 INJECTION INTRAMUSCULAR; INTRAVENOUS; SUBCUTANEOUS at 17:12

## 2018-12-21 RX ADMIN — MORPHINE SULFATE 2 MILLIGRAM(S): 50 CAPSULE, EXTENDED RELEASE ORAL at 23:30

## 2018-12-21 RX ADMIN — PIPERACILLIN AND TAZOBACTAM 200 GRAM(S): 4; .5 INJECTION, POWDER, LYOPHILIZED, FOR SOLUTION INTRAVENOUS at 21:07

## 2018-12-21 RX ADMIN — Medication 100 MILLIGRAM(S): at 23:14

## 2018-12-21 RX ADMIN — SODIUM CHLORIDE 1000 MILLILITER(S): 9 INJECTION INTRAMUSCULAR; INTRAVENOUS; SUBCUTANEOUS at 19:00

## 2018-12-21 RX ADMIN — MORPHINE SULFATE 4 MILLIGRAM(S): 50 CAPSULE, EXTENDED RELEASE ORAL at 17:14

## 2018-12-21 RX ADMIN — SODIUM CHLORIDE 1000 MILLILITER(S): 9 INJECTION INTRAMUSCULAR; INTRAVENOUS; SUBCUTANEOUS at 18:12

## 2018-12-21 RX ADMIN — SODIUM CHLORIDE 125 MILLILITER(S): 9 INJECTION INTRAMUSCULAR; INTRAVENOUS; SUBCUTANEOUS at 22:31

## 2018-12-21 RX ADMIN — ONDANSETRON 4 MILLIGRAM(S): 8 TABLET, FILM COATED ORAL at 17:14

## 2018-12-21 RX ADMIN — IOHEXOL 30 MILLILITER(S): 300 INJECTION, SOLUTION INTRAVENOUS at 17:30

## 2018-12-21 NOTE — H&P ADULT - PROBLEM SELECTOR PLAN 1
Intractable pain  Trial of morphine sulfate  Given Zosyn. Will transition to cipro/flagyl regimen. Add probiotic  GI consulted (William Intractable pain  Trial of morphine sulfate  Given Zosyn. Will transition to cipro/flagyl regimen. Add probiotic  GI consulted (Berenice)  -of note, CT with concern for UTI; UA neg and exam not impressive. Nonethless, FU Cx as he is on cipro and flagyl.

## 2018-12-21 NOTE — ED PROVIDER NOTE - GASTROINTESTINAL, MLM
Abdomen soft, tender rlq to palp with guarding pt does not want me to touch llq no cvat denies gu sx

## 2018-12-21 NOTE — H&P ADULT - NSHPPHYSICALEXAM_GEN_ALL_CORE
Vital Signs Last 24 Hrs  T(C): 36.8 (21 Dec 2018 23:11), Max: 36.9 (21 Dec 2018 19:15)  T(F): 98.2 (21 Dec 2018 23:11), Max: 98.5 (21 Dec 2018 19:15)  HR: 67 (21 Dec 2018 23:11) (66 - 80)  BP: 131/73 (21 Dec 2018 23:11) (131/73 - 154/84)  BP(mean): --  RR: 16 (21 Dec 2018 23:11) (14 - 16)  SpO2: 97% (21 Dec 2018 23:11) (97% - 100%)    General: Well developed, well nourished, NAD  HEENT: NCAT, PERRLA, EOMI bl, moist mucous membranes   Neck: Supple, nontender, no mass  Neurology: A&Ox3, CN II-XII grossly intact, sensation intact  Respiratory: CTA B/L, No W/R/R  CV: RRR, +S1/S2, no murmurs, rubs or gallops  Abdominal: Soft,  ND, tender in LLQ without rebound or gaurding +BSx4.   Extremities: No edema [place] neg+ peripheral pulses  MSK: Normal ROM, no joint erythema or warmth, no joint swelling   Skin: warm, dry, normal color, no rash or abnormal lesions

## 2018-12-21 NOTE — H&P ADULT - HISTORY OF PRESENT ILLNESS
full note to follow 62 yo M PMH HTN DM2 and diverticulitis, presents to the ED with complaints of LLQ abdominal pain X 2 days. His pain is described as crampy and non radiating, constant and 7/10. He had nausea w/o emesis. He denies recent diarrhea. He described decreased appetite but has been utilizing mostly broth and water during the last 24H.     In the ED, CT ABD showed diverticulitis. He was given Zosyn, pain regimen with morphine.

## 2018-12-21 NOTE — ED PROVIDER NOTE - PROGRESS NOTE DETAILS
pt re assessed still hhas tender abd and aware of diagnosis, we discussed differential of admission versus discharge with continued abd pian and need for pain meds hospitalist paged, dw dr tapia, dr reyes to take pt

## 2018-12-21 NOTE — ED PROVIDER NOTE - OBJECTIVE STATEMENT
Pt is a a 64 yo male who has hx of diverticulitis sp left ear cholestoma surgery with loss of hearing, never smoker pmd dr kwabena butler.  was in usoh until 2 nights ago when he began to have pain in llq. the pain started as a gas pain and progressed to the point where it is very painful  he contacted his pmd and was directed to the er for eval  no fever no chills no cp no sob no other co.

## 2018-12-21 NOTE — ED ADULT NURSE NOTE - NSIMPLEMENTINTERV_GEN_ALL_ED
Implemented All Universal Safety Interventions:  [place] to call system. Call bell, personal items and telephone within reach. Instruct patient to call for assistance. Room bathroom lighting operational. Non-slip footwear when patient is off stretcher. Physically safe environment: no spills, clutter or unnecessary equipment. Stretcher in lowest position, wheels locked, appropriate side rails in place.

## 2018-12-21 NOTE — ED PROVIDER NOTE - CHPI ED SYMPTOMS NEG
no hematuria/no dysuria/no fever/no diarrhea/no blood in stool/no abdominal distension/no chills/no nausea

## 2018-12-21 NOTE — H&P ADULT - NSHPLABSRESULTS_GEN_ALL_CORE
17.0   12.54 )-----------( 243      ( 21 Dec 2018 17:13 )             50.7           138  |  100  |  15  ----------------------------<  92  4.2   |  31  |  1.10    Ca    9.6      21 Dec 2018 17:13    TPro  8.6<H>  /  Alb  4.3  /  TBili  1.5<H>  /  DBili  x   /  AST  21  /  ALT  41  /  AlkPhos  80                Urinalysis Basic - ( 21 Dec 2018 17:13 )    Color: Yellow / Appearance: Clear / S.005 / pH: x  Gluc: x / Ketone: Trace  / Bili: Negative / Urobili: Negative   Blood: x / Protein: Negative / Nitrite: Negative   Leuk Esterase: Negative / RBC: x / WBC x   Sq Epi: x / Non Sq Epi: x / Bacteria: x        PT/INR - ( 21 Dec 2018 17:13 )   PT: 13.9 sec;   INR: 1.22 ratio         PTT - ( 21 Dec 2018 17:13 )  PTT:35.5 sec    Lactate Trend   @ 20:26 Lactate:1.4    @ 17:13 Lactate:2.3             CAPILLARY BLOOD GLUCOSE

## 2018-12-21 NOTE — H&P ADULT - NSHPREVIEWOFSYSTEMS_GEN_ALL_CORE
Constitutional: denies fever, chills, general malaise, weight loss, weight gain, diaphoresis   HEENT: denies dry mouth, sore throat, runny nose, photophobia, blurry vision, double vision, discharge, eye pain, difficulty hearing, vertigo, dysphagia, epistaxis, recent dental work    Respiratory: denies SOB, NOE, cough, sputum production, wheezing, hemoptysis  Cardiovascular: denies CP, palpitations, edema  Gastrointestinal: Per HPI. denies melena, hematochezia   Genitourinary: denies dysuria, frequency, urgency, incontinence, hematuria   Skin/Breast: denies rash, hives, itching, masses, hair loss   Musculoskeletal: denies myalgias, arthritis, joint swelling, muscle weakness  Neurologic: denies syncope, LOC, headache, weakness, dizziness, paresthesias, numbness, seizures, confusion, dementia   Psychiatric: denies feeling anxious, depressed, suicidal, homicidal thoughts  Endocrine: denies , cold or heat intolerance, polydipsia, polyuria, polyphagia   Hematology/Oncology: denies bruising, tender or enlarged lymph nodes   ROS negative except as noted above

## 2018-12-22 DIAGNOSIS — Z29.9 ENCOUNTER FOR PROPHYLACTIC MEASURES, UNSPECIFIED: ICD-10-CM

## 2018-12-22 DIAGNOSIS — Z98.890 OTHER SPECIFIED POSTPROCEDURAL STATES: Chronic | ICD-10-CM

## 2018-12-22 DIAGNOSIS — I10 ESSENTIAL (PRIMARY) HYPERTENSION: ICD-10-CM

## 2018-12-22 DIAGNOSIS — E11.9 TYPE 2 DIABETES MELLITUS WITHOUT COMPLICATIONS: ICD-10-CM

## 2018-12-22 DIAGNOSIS — E78.5 HYPERLIPIDEMIA, UNSPECIFIED: ICD-10-CM

## 2018-12-22 DIAGNOSIS — K57.92 DIVERTICULITIS OF INTESTINE, PART UNSPECIFIED, WITHOUT PERFORATION OR ABSCESS WITHOUT BLEEDING: ICD-10-CM

## 2018-12-22 LAB
ALBUMIN SERPL ELPH-MCNC: 3.2 G/DL — LOW (ref 3.3–5)
ALP SERPL-CCNC: 61 U/L — SIGNIFICANT CHANGE UP (ref 40–120)
ALT FLD-CCNC: 27 U/L — SIGNIFICANT CHANGE UP (ref 12–78)
ANION GAP SERPL CALC-SCNC: 6 MMOL/L — SIGNIFICANT CHANGE UP (ref 5–17)
AST SERPL-CCNC: 17 U/L — SIGNIFICANT CHANGE UP (ref 15–37)
BASOPHILS # BLD AUTO: 0.03 K/UL — SIGNIFICANT CHANGE UP (ref 0–0.2)
BASOPHILS NFR BLD AUTO: 0.4 % — SIGNIFICANT CHANGE UP (ref 0–2)
BILIRUB SERPL-MCNC: 1 MG/DL — SIGNIFICANT CHANGE UP (ref 0.2–1.2)
BUN SERPL-MCNC: 11 MG/DL — SIGNIFICANT CHANGE UP (ref 7–23)
CALCIUM SERPL-MCNC: 8 MG/DL — LOW (ref 8.5–10.1)
CHLORIDE SERPL-SCNC: 110 MMOL/L — HIGH (ref 96–108)
CO2 SERPL-SCNC: 28 MMOL/L — SIGNIFICANT CHANGE UP (ref 22–31)
CREAT SERPL-MCNC: 0.96 MG/DL — SIGNIFICANT CHANGE UP (ref 0.5–1.3)
EOSINOPHIL # BLD AUTO: 0.24 K/UL — SIGNIFICANT CHANGE UP (ref 0–0.5)
EOSINOPHIL NFR BLD AUTO: 3.3 % — SIGNIFICANT CHANGE UP (ref 0–6)
GLUCOSE SERPL-MCNC: 77 MG/DL — SIGNIFICANT CHANGE UP (ref 70–99)
HBA1C BLD-MCNC: 6 % — HIGH (ref 4–5.6)
HCT VFR BLD CALC: 43.5 % — SIGNIFICANT CHANGE UP (ref 39–50)
HGB BLD-MCNC: 14.1 G/DL — SIGNIFICANT CHANGE UP (ref 13–17)
IMM GRANULOCYTES NFR BLD AUTO: 0.3 % — SIGNIFICANT CHANGE UP (ref 0–1.5)
LYMPHOCYTES # BLD AUTO: 1.23 K/UL — SIGNIFICANT CHANGE UP (ref 1–3.3)
LYMPHOCYTES # BLD AUTO: 16.8 % — SIGNIFICANT CHANGE UP (ref 13–44)
MCHC RBC-ENTMCNC: 30.1 PG — SIGNIFICANT CHANGE UP (ref 27–34)
MCHC RBC-ENTMCNC: 32.4 GM/DL — SIGNIFICANT CHANGE UP (ref 32–36)
MCV RBC AUTO: 92.8 FL — SIGNIFICANT CHANGE UP (ref 80–100)
MONOCYTES # BLD AUTO: 0.83 K/UL — SIGNIFICANT CHANGE UP (ref 0–0.9)
MONOCYTES NFR BLD AUTO: 11.3 % — SIGNIFICANT CHANGE UP (ref 2–14)
NEUTROPHILS # BLD AUTO: 4.98 K/UL — SIGNIFICANT CHANGE UP (ref 1.8–7.4)
NEUTROPHILS NFR BLD AUTO: 67.9 % — SIGNIFICANT CHANGE UP (ref 43–77)
PLATELET # BLD AUTO: 188 K/UL — SIGNIFICANT CHANGE UP (ref 150–400)
POTASSIUM SERPL-MCNC: 4.4 MMOL/L — SIGNIFICANT CHANGE UP (ref 3.5–5.3)
POTASSIUM SERPL-SCNC: 4.4 MMOL/L — SIGNIFICANT CHANGE UP (ref 3.5–5.3)
PROT SERPL-MCNC: 6.6 G/DL — SIGNIFICANT CHANGE UP (ref 6–8.3)
RBC # BLD: 4.69 M/UL — SIGNIFICANT CHANGE UP (ref 4.2–5.8)
RBC # FLD: 12.1 % — SIGNIFICANT CHANGE UP (ref 10.3–14.5)
SODIUM SERPL-SCNC: 144 MMOL/L — SIGNIFICANT CHANGE UP (ref 135–145)
WBC # BLD: 7.33 K/UL — SIGNIFICANT CHANGE UP (ref 3.8–10.5)
WBC # FLD AUTO: 7.33 K/UL — SIGNIFICANT CHANGE UP (ref 3.8–10.5)

## 2018-12-22 PROCEDURE — 99233 SBSQ HOSP IP/OBS HIGH 50: CPT

## 2018-12-22 RX ORDER — MORPHINE SULFATE 50 MG/1
2 CAPSULE, EXTENDED RELEASE ORAL EVERY 6 HOURS
Qty: 0 | Refills: 0 | Status: DISCONTINUED | OUTPATIENT
Start: 2018-12-22 | End: 2018-12-23

## 2018-12-22 RX ADMIN — Medication 1 TABLET(S): at 17:59

## 2018-12-22 RX ADMIN — Medication 100 MILLIGRAM(S): at 06:02

## 2018-12-22 RX ADMIN — Medication 100 MILLIGRAM(S): at 21:15

## 2018-12-22 RX ADMIN — Medication 1 TABLET(S): at 08:09

## 2018-12-22 RX ADMIN — Medication 200 MILLIGRAM(S): at 23:08

## 2018-12-22 RX ADMIN — Medication 200 MILLIGRAM(S): at 12:01

## 2018-12-22 RX ADMIN — ONDANSETRON 4 MILLIGRAM(S): 8 TABLET, FILM COATED ORAL at 01:00

## 2018-12-22 RX ADMIN — Medication 100 MILLIGRAM(S): at 13:58

## 2018-12-22 RX ADMIN — SODIUM CHLORIDE 125 MILLILITER(S): 9 INJECTION INTRAMUSCULAR; INTRAVENOUS; SUBCUTANEOUS at 01:22

## 2018-12-22 RX ADMIN — SODIUM CHLORIDE 125 MILLILITER(S): 9 INJECTION INTRAMUSCULAR; INTRAVENOUS; SUBCUTANEOUS at 13:58

## 2018-12-22 RX ADMIN — Medication 650 MILLIGRAM(S): at 01:00

## 2018-12-22 RX ADMIN — Medication 1 TABLET(S): at 12:01

## 2018-12-22 RX ADMIN — ATORVASTATIN CALCIUM 10 MILLIGRAM(S): 80 TABLET, FILM COATED ORAL at 21:15

## 2018-12-22 RX ADMIN — Medication 650 MILLIGRAM(S): at 02:00

## 2018-12-22 RX ADMIN — Medication 200 MILLIGRAM(S): at 00:16

## 2018-12-22 NOTE — CONSULT NOTE ADULT - SUBJECTIVE AND OBJECTIVE BOX
Chief Complaint:  Patient is a 63y old  Male who presents with a chief complaint of diverticulitis (21 Dec 2018 22:54)      HPI:   AS PER ER:  64 yo M PMH HTN DM2 and diverticulitis, presents to the ED with complaints of LLQ abdominal pain X 2 days. His pain is described as crampy and non radiating, constant and 7/10. He had nausea w/o emesis. He denies recent diarrhea. He described decreased appetite but has been utilizing mostly broth and water during the last 24H.     ACC TO PT:  Decreased PO  + malaise  + change decrease in bowel habits, c last BM ~ 2-3 days ago  Mild groin discomfort, but no overt urinary symptoms    Symptoms overall similar to prior episode of diverticulitis ~ 1 yr ago; rx'd conservatively    NO PRIOR COLON!    No GERD, dysphagia,  cough, choke, globus;   No prior EGD    PAST MEDICAL & SURGICAL HISTORY:  Diabetes mellitus  HTN (hypertension)  Hyperlipidemia  H/O right inguinal hernia repair  S/P ear surgery      Medications:  Home Medications:   * Incomplete Medication History as of 21-Dec-2018 16:32 documented in Structured Notes  · 	atorvastatin: Last Dose Taken:    · 	ramipril: Last Dose Taken:    · 	glimepiride: Last Dose Taken:        MEDICATIONS  (STANDING):  atorvastatin 10 milliGRAM(s) Oral at bedtime  ciprofloxacin   IVPB 400 milliGRAM(s) IV Intermittent every 12 hours  ciprofloxacin   IVPB      dextrose 5%. 1000 milliLiter(s) (50 mL/Hr) IV Continuous <Continuous>  dextrose 50% Injectable 12.5 Gram(s) IV Push once  insulin lispro (HumaLOG) corrective regimen sliding scale   SubCutaneous Before meals and at bedtime  lactobacillus acidophilus 1 Tablet(s) Oral three times a day with meals  lisinopril 20 milliGRAM(s) Oral daily  metroNIDAZOLE  IVPB 500 milliGRAM(s) IV Intermittent every 8 hours  metroNIDAZOLE  IVPB      sodium chloride 0.9%. 1000 milliLiter(s) (125 mL/Hr) IV Continuous <Continuous>    MEDICATIONS  (PRN):  acetaminophen   Tablet .. 650 milliGRAM(s) Oral every 6 hours PRN Temp greater or equal to 38C (100.4F), Mild Pain (1 - 3)  dextrose 40% Gel 15 Gram(s) Oral once PRN Blood Glucose LESS THAN 70 milliGRAM(s)/deciliter  glucagon  Injectable 1 milliGRAM(s) IntraMuscular once PRN Glucose LESS THAN 70 milligrams/deciliter  morphine  - Injectable 2 milliGRAM(s) IV Push every 6 hours PRN Moderate Pain (4 - 6)  ondansetron Injectable 4 milliGRAM(s) IV Push every 6 hours PRN Nausea      Allergies:  No Known Allergies      Social History:   NS  OCC ETOH    account mgr, LOG ME IN    FAMILY HISTORY:  No pertinent family history in first degree relatives        ROS:     General:  No wt loss, fevers, chills, night sweats, fatigue,   ENT:  cholesteatoma, inactive  CV:  No pain, palpitations, hypo/hypertension  Resp:  neg  GI:  HPI  :  neg  Muscle:  neg  Neuro:  neg  Psych:  neg  Heme:  No petechiae, ecchymosis, easy bruisability  Skin:  neg    PHYSICAL EXAM:   Vital Signs:  Vital Signs Last 24 Hrs  T(C): 36.6 (22 Dec 2018 13:05), Max: 36.9 (21 Dec 2018 19:15)  T(F): 97.8 (22 Dec 2018 13:05), Max: 98.5 (21 Dec 2018 19:15)  HR: 78 (22 Dec 2018 13:05) (66 - 80)  BP: 128/74 (22 Dec 2018 13:05) (105/52 - 154/84)  BP(mean): --  RR: 17 (22 Dec 2018 13:05) (14 - 17)  SpO2: 95% (22 Dec 2018 13:05) (92% - 100%)  Daily Height in cm: 167.64 (21 Dec 2018 16:28)    Daily     GENERAL:  well-groomed, well-nourished, no distress  HEENT:  NC/AT,    CHEST:   clear  HEART:  s1s2  ABDOMEN:  Soft, minimal LLQ discomfort, non-distended, normoactive bowel sounds,  no masses ,no hepatosplenomegaly, no signs of chronic liver disease  EXTREMITIES:  no c/c/e  SKIN:  neg  NEURO:  neg    LABS:                        14.1   7.33  )-----------( 188      ( 22 Dec 2018 09:06 )             43.5         144  |  110<H>  |  11  ----------------------------<  77  4.4   |  28  |  0.96    Ca    8.0<L>      22 Dec 2018 09:06    TPro  6.6  /  Alb  3.2<L>  /  TBili  1.0  /  DBili  x   /  AST  17  /  ALT  27  /  AlkPhos  61  12-22    LIVER FUNCTIONS - ( 22 Dec 2018 09:06 )  Alb: 3.2 g/dL / Pro: 6.6 g/dL / ALK PHOS: 61 U/L / ALT: 27 U/L / AST: 17 U/L / GGT: x           PT/INR - ( 21 Dec 2018 17:13 )   PT: 13.9 sec;   INR: 1.22 ratio         PTT - ( 21 Dec 2018 17:13 )  PTT:35.5 sec  Urinalysis Basic - ( 21 Dec 2018 17:13 )    Color: Yellow / Appearance: Clear / S.005 / pH: x  Gluc: x / Ketone: Trace  / Bili: Negative / Urobili: Negative   Blood: x / Protein: Negative / Nitrite: Negative   Leuk Esterase: Negative / RBC: x / WBC x   Sq Epi: x / Non Sq Epi: x / Bacteria: x      Amylase Serum--      Lipase [id number]       Ammonia--        Imaging:        EXAM:  CT ABDOMEN AND PELVIS OC IC                            PROCEDURE DATE:  2018          INTERPRETATION:  CLINICAL INFORMATION: Left lower quadrant pain, evaluate   for diverticulitis         COMPARISON: None.    PROCEDURE:   CT of the Abdomen and Pelvis was performed with intravenous contrast.   Intravenous contrast: 100 ml Omnipaque 350. 0 ml discarded.  Oral contrast: positive contrast was administered.  Sagittal and coronal reformats were performed.    FINDINGS:    LOWER CHEST: Trace bibasilar atelectasis.    LIVER: Subcentimeter hypodensity at the hepatic dome, too small to   characterize. Atrophy and heterogeneity of the lateral segment of the   left hepatic lobe.  BILE DUCTS: Normal caliber.  GALLBLADDER: Within normal limits.  SPLEEN: Within normal limits.  PANCREAS: Within normal limits.  ADRENALS: Within normal limits.  KIDNEYS/URETERS: Trace bilateral hydroureteronephrosis. There are   bilateral nonobstructing renal calculi measuring up to 6 mm in the lower   pole of the left kidney. There is a punctate nonobstructing calculus in   the lower pole the right kidney.    BLADDER: Well distended with questionable mild wall thickening.  REPRODUCTIVE ORGANS: Prostate gland is mildly enlarged.    BOWEL: No bowel obstruction. There is diverticulitis involving the distal   descending colon with surrounding inflammatory change.Appendix is normal.  PERITONEUM: Within normal limits.   VESSELS:  Atherosclerotic changes of the aorta and branching vessels.  RETROPERITONEUM: No lymphadenopathy.    ABDOMINAL WALL: Within normal limits.  BONES: Degenerative changes of the lumbar spine with mild levoscoliosis.    IMPRESSION: Diverticulitis involving the distal descending colon. No   gross perforation or associated abscess.    Bilateral nonobstructing renal calculi and trace bilateral   hydronephrosis. Distended urinary bladder with questionable bladder wall   thickening. Correlate with urinalysis.     Additional findings as above.      BEATRICE CHAPARRO M.D., ATTENDING RADIOLOGIST  This document has been electronically signed. Dec 21 2018  8:47PM

## 2018-12-22 NOTE — CONSULT NOTE ADULT - ASSESSMENT
RECURRING MILD DIVERTICULITIS, CURRENTLY IMPROVING      RECC:    CLEARS  IV ABX  PAIN CTRL  SLOWLY ADVANCE DIET AS TRAMAINE  SERIAL LABS / ESR  IF PT REMAINS STABLE, POSSIBLE DC IN 24-28 HOURS C CLOSE OUTPT FU  NEEDS OUTPT COLONOSCOPY!!

## 2018-12-22 NOTE — PROGRESS NOTE ADULT - PROBLEM SELECTOR PLAN 2
hold oral meds while in hosp.  Currently borderline hypoglycemic, likely due to prior npo/clear liquid status.  Continue finger sticks. Insulin corrective scale

## 2018-12-22 NOTE — PROGRESS NOTE ADULT - NSHPATTENDINGPLANDISCUSS_GEN_ALL_CORE
GI, patient (re: treatment plan going forward, diet, advancing diet, antibiotic therapy, discharge planning)

## 2018-12-22 NOTE — PROGRESS NOTE ADULT - SUBJECTIVE AND OBJECTIVE BOX
ADMISSION HPI:  64 yo M PMH HTN DM2 and diverticulitis, presents to the ED with complaints of LLQ abdominal pain X 2 days. His pain is described as crampy and non radiating, constant and 7/10. He had nausea w/o emesis. He denies recent diarrhea. He described decreased appetite but has been utilizing mostly broth and water during the last 24H.     In the ED, CT ABD showed diverticulitis. He was given Zosyn, pain regimen with morphine. (21 Dec 2018 22:54)    INTERVAL HPI:  Patient seen and examined. Denies pain today. Mild discomfort with applied pressure. He denies diarrhea or vomiting. No blood in stool. He had been tolerating full liquid diet today.     REVIEW OF SYSTEMS:    CONSTITUTIONAL: No weakness, fevers or chills  EYES/ENT: No visual changes, no throat pain   RESPIRATORY: No cough, wheezing, hemoptysis; No shortness of breath  CARDIOVASCULAR: No chest pain or palpitations  GASTROINTESTINAL: No abdominal pain, nausea, vomiting, diarrhea  GENITOURINARY: No dysuria, frequency or hematuria  NEUROLOGICAL: No dizziness, numbness, or weakness  SKIN: No itching, burning, rashes, or lesions   All other review of systems is negative unless indicated above.    VITAL SIGNS:  Vital Signs Last 24 Hrs  T(C): 36.6 (12-22-18 @ 13:05), Max: 36.9 (12-21-18 @ 19:15)  T(F): 97.8 (12-22-18 @ 13:05), Max: 98.5 (12-21-18 @ 19:15)  HR: 78 (12-22-18 @ 13:05) (66 - 78)  BP: 128/74 (12-22-18 @ 13:05) (105/52 - 144/78)  BP(mean): --  RR: 17 (12-22-18 @ 13:05) (15 - 17)  SpO2: 95% (12-22-18 @ 13:05) (92% - 99%)      PHYSICAL EXAM:     GENERAL: no acute distress  HEENT: NC/AT, EOMI, neck supple, MMM  RESPIRATORY: LCTAB/L, no rhonchi, rales, or wheezing  CARDIOVASCULAR: RRR, no murmurs, gallops, rubs  ABDOMINAL: soft, mildly tender to palpation over LLQ/flank, non-distended, positive bowel sounds   EXTREMITIES: no clubbing, cyanosis, or edema  NEUROLOGICAL: alert and oriented x 3, non-focal  SKIN: warm, dry, intact.  MUSCULOSKELETAL: no gross joint deformity                          14.1   7.33  )-----------( 188      ( 22 Dec 2018 09:06 )             43.5     12-22    144  |  110<H>  |  11  ----------------------------<  77  4.4   |  28  |  0.96    Ca    8.0<L>      22 Dec 2018 09:06    TPro  6.6  /  Alb  3.2<L>  /  TBili  1.0  /  DBili  x   /  AST  17  /  ALT  27  /  AlkPhos  61  12-22    PT/INR - ( 21 Dec 2018 17:13 )   PT: 13.9 sec;   INR: 1.22 ratio         PTT - ( 21 Dec 2018 17:13 )  PTT:35.5 sec    CAPILLARY BLOOD GLUCOSE  POCT Blood Glucose.: 89 mg/dL (22 Dec 2018 16:48)  POCT Blood Glucose.: 88 mg/dL (22 Dec 2018 12:06)  POCT Blood Glucose.: 95 mg/dL (22 Dec 2018 08:07)      MEDICATIONS  (STANDING):  atorvastatin 10 milliGRAM(s) Oral at bedtime  ciprofloxacin   IVPB 400 milliGRAM(s) IV Intermittent every 12 hours  ciprofloxacin   IVPB      dextrose 5%. 1000 milliLiter(s) (50 mL/Hr) IV Continuous <Continuous>  dextrose 50% Injectable 12.5 Gram(s) IV Push once  insulin lispro (HumaLOG) corrective regimen sliding scale   SubCutaneous Before meals and at bedtime  lactobacillus acidophilus 1 Tablet(s) Oral three times a day with meals  lisinopril 20 milliGRAM(s) Oral daily  metroNIDAZOLE  IVPB 500 milliGRAM(s) IV Intermittent every 8 hours  metroNIDAZOLE  IVPB      sodium chloride 0.9%. 1000 milliLiter(s) (125 mL/Hr) IV Continuous <Continuous>    MEDICATIONS  (PRN):  acetaminophen   Tablet .. 650 milliGRAM(s) Oral every 6 hours PRN Temp greater or equal to 38C (100.4F), Mild Pain (1 - 3)  dextrose 40% Gel 15 Gram(s) Oral once PRN Blood Glucose LESS THAN 70 milliGRAM(s)/deciliter  glucagon  Injectable 1 milliGRAM(s) IntraMuscular once PRN Glucose LESS THAN 70 milligrams/deciliter  morphine  - Injectable 2 milliGRAM(s) IV Push every 6 hours PRN Moderate Pain (4 - 6)  ondansetron Injectable 4 milliGRAM(s) IV Push every 6 hours PRN Nausea

## 2018-12-22 NOTE — PATIENT PROFILE ADULT - NSSTREETDRUGUSE_GEN_A_NUR
Assessment/Plan  1. Acute otitis media, unspecified laterality, unspecified otitis media type  - amoxicillin (AMOXIL) 250 MG/5ML suspension; Take 2 and 1/2 tsp BID for 10 days  Dispense: 250 mL; Refill: 0  - OTC Children's Tylenol or Ibuprofen for pain        Fluids  Rest   Humidity at home  Temp control at home  To ER or UC with increased symptoms  FU at clinic if symptoms fail to impmrove    [facility]  [phone]         never used

## 2018-12-22 NOTE — PROGRESS NOTE ADULT - PROBLEM SELECTOR PLAN 1
Initially with severe pain, now resolved.  -Given Zosyn in ED, continue Cipro/Flagyl for now.   -Continue probiotic  -[facility] following (Covering [facility])  -of note, CT with concern for UTI; UA neg and exam not impressive. Nonethless, FU Cx as he is on cipro and flagyl.

## 2018-12-23 ENCOUNTER — TRANSCRIPTION ENCOUNTER (OUTPATIENT)
Age: 63
End: 2018-12-23

## 2018-12-23 VITALS
OXYGEN SATURATION: 95 % | HEART RATE: 74 BPM | DIASTOLIC BLOOD PRESSURE: 64 MMHG | TEMPERATURE: 98 F | SYSTOLIC BLOOD PRESSURE: 135 MMHG | RESPIRATION RATE: 16 BRPM

## 2018-12-23 LAB
ANION GAP SERPL CALC-SCNC: 6 MMOL/L — SIGNIFICANT CHANGE UP (ref 5–17)
BUN SERPL-MCNC: 9 MG/DL — SIGNIFICANT CHANGE UP (ref 7–23)
CALCIUM SERPL-MCNC: 8.8 MG/DL — SIGNIFICANT CHANGE UP (ref 8.5–10.1)
CHLORIDE SERPL-SCNC: 105 MMOL/L — SIGNIFICANT CHANGE UP (ref 96–108)
CO2 SERPL-SCNC: 29 MMOL/L — SIGNIFICANT CHANGE UP (ref 22–31)
CREAT SERPL-MCNC: 1 MG/DL — SIGNIFICANT CHANGE UP (ref 0.5–1.3)
GLUCOSE SERPL-MCNC: 118 MG/DL — HIGH (ref 70–99)
HCT VFR BLD CALC: 45.3 % — SIGNIFICANT CHANGE UP (ref 39–50)
HGB BLD-MCNC: 15.2 G/DL — SIGNIFICANT CHANGE UP (ref 13–17)
MCHC RBC-ENTMCNC: 30.4 PG — SIGNIFICANT CHANGE UP (ref 27–34)
MCHC RBC-ENTMCNC: 33.6 GM/DL — SIGNIFICANT CHANGE UP (ref 32–36)
MCV RBC AUTO: 90.6 FL — SIGNIFICANT CHANGE UP (ref 80–100)
NRBC # BLD: 0 /100 WBCS — SIGNIFICANT CHANGE UP (ref 0–0)
PLATELET # BLD AUTO: 198 K/UL — SIGNIFICANT CHANGE UP (ref 150–400)
POTASSIUM SERPL-MCNC: 3.5 MMOL/L — SIGNIFICANT CHANGE UP (ref 3.5–5.3)
POTASSIUM SERPL-SCNC: 3.5 MMOL/L — SIGNIFICANT CHANGE UP (ref 3.5–5.3)
RBC # BLD: 5 M/UL — SIGNIFICANT CHANGE UP (ref 4.2–5.8)
RBC # FLD: 11.9 % — SIGNIFICANT CHANGE UP (ref 10.3–14.5)
SODIUM SERPL-SCNC: 140 MMOL/L — SIGNIFICANT CHANGE UP (ref 135–145)
WBC # BLD: 6.46 K/UL — SIGNIFICANT CHANGE UP (ref 3.8–10.5)
WBC # FLD AUTO: 6.46 K/UL — SIGNIFICANT CHANGE UP (ref 3.8–10.5)

## 2018-12-23 PROCEDURE — 85027 COMPLETE CBC AUTOMATED: CPT

## 2018-12-23 PROCEDURE — 99285 EMERGENCY DEPT VISIT HI MDM: CPT | Mod: 25

## 2018-12-23 PROCEDURE — 71046 X-RAY EXAM CHEST 2 VIEWS: CPT

## 2018-12-23 PROCEDURE — 36415 COLL VENOUS BLD VENIPUNCTURE: CPT

## 2018-12-23 PROCEDURE — 85610 PROTHROMBIN TIME: CPT

## 2018-12-23 PROCEDURE — 80048 BASIC METABOLIC PNL TOTAL CA: CPT

## 2018-12-23 PROCEDURE — 83605 ASSAY OF LACTIC ACID: CPT

## 2018-12-23 PROCEDURE — 81003 URINALYSIS AUTO W/O SCOPE: CPT

## 2018-12-23 PROCEDURE — 83690 ASSAY OF LIPASE: CPT

## 2018-12-23 PROCEDURE — 96375 TX/PRO/DX INJ NEW DRUG ADDON: CPT

## 2018-12-23 PROCEDURE — 85730 THROMBOPLASTIN TIME PARTIAL: CPT

## 2018-12-23 PROCEDURE — 96374 THER/PROPH/DIAG INJ IV PUSH: CPT | Mod: XU

## 2018-12-23 PROCEDURE — 83036 HEMOGLOBIN GLYCOSYLATED A1C: CPT

## 2018-12-23 PROCEDURE — 93005 ELECTROCARDIOGRAM TRACING: CPT

## 2018-12-23 PROCEDURE — 82962 GLUCOSE BLOOD TEST: CPT

## 2018-12-23 PROCEDURE — 86900 BLOOD TYPING SEROLOGIC ABO: CPT

## 2018-12-23 PROCEDURE — 80053 COMPREHEN METABOLIC PANEL: CPT

## 2018-12-23 PROCEDURE — 99239 HOSP IP/OBS DSCHRG MGMT >30: CPT

## 2018-12-23 PROCEDURE — 86901 BLOOD TYPING SEROLOGIC RH(D): CPT

## 2018-12-23 PROCEDURE — 74177 CT ABD & PELVIS W/CONTRAST: CPT

## 2018-12-23 PROCEDURE — 86850 RBC ANTIBODY SCREEN: CPT

## 2018-12-23 RX ORDER — LACTOBACILLUS ACIDOPHILUS 100MM CELL
1 CAPSULE ORAL
Qty: 21 | Refills: 0
Start: 2018-12-23 | End: 2018-12-29

## 2018-12-23 RX ORDER — METRONIDAZOLE 500 MG
1 TABLET ORAL
Qty: 15 | Refills: 0
Start: 2018-12-23 | End: 2018-12-27

## 2018-12-23 RX ORDER — POLYETHYLENE GLYCOL 3350 17 G/17G
17 POWDER, FOR SOLUTION ORAL ONCE
Qty: 0 | Refills: 0 | Status: DISCONTINUED | OUTPATIENT
Start: 2018-12-23 | End: 2018-12-23

## 2018-12-23 RX ORDER — CIPROFLOXACIN LACTATE 400MG/40ML
1 VIAL (ML) INTRAVENOUS
Qty: 10 | Refills: 0
Start: 2018-12-23 | End: 2018-12-27

## 2018-12-23 RX ADMIN — LISINOPRIL 20 MILLIGRAM(S): 2.5 TABLET ORAL at 06:34

## 2018-12-23 RX ADMIN — Medication 100 MILLIGRAM(S): at 06:34

## 2018-12-23 RX ADMIN — Medication 1 TABLET(S): at 07:59

## 2018-12-23 RX ADMIN — Medication 1 TABLET(S): at 12:28

## 2018-12-23 RX ADMIN — Medication 200 MILLIGRAM(S): at 12:27

## 2018-12-23 NOTE — DISCHARGE NOTE ADULT - HOSPITAL COURSE
62 yo M PMH HTN DM2 and diverticulitis, presents to the ED with complaints of LLQ abdominal pain X 2 days. His pain is described as crampy and non radiating, constant and 7/10. He had nausea w/o emesis. He denies recent diarrhea. He described decreased appetite but has been utilizing mostly broth and water during the last 24H.     In the ED, CT ABD showed diverticulitis. He was given Zosyn, pain regimen with morphine.   Admitted for diverticulitis, and followed by GI. Treated woth IV cipro and flagyl. Abd pain resolved and tolerated PO diet.   Pt is stable for D/c home on PO antibx and Bacid follow with GI and will need colonoscopy.  Vital Signs Last 24 Hrs  T(C): 36.6 (23 Dec 2018 04:54), Max: 36.7 (22 Dec 2018 19:31)  T(F): 97.8 (23 Dec 2018 04:54), Max: 98 (22 Dec 2018 19:31)  HR: 61 (23 Dec 2018 04:54) (61 - 78)  BP: 129/70 (23 Dec 2018 04:54) (128/74 - 151/86)  BP(mean): --  RR: 14 (23 Dec 2018 04:54) (14 - 17)  SpO2: 95% (23 Dec 2018 04:54) (95% - 96%)    General: NAD,   Neurology: A&Ox3, nonfocal,  moving all extremities  Respiratory: CTA B/L  CV: RRR, S1S2, no murmurs, rubs or gallops  Abdominal: Soft, NT, ND +BS  Extremities: No edema, + peripheral pulses    PMD was notified

## 2018-12-23 NOTE — DISCHARGE NOTE ADULT - PLAN OF CARE
resolved C/W with  Cipro and flagyl.   F/u with  BRYANNA Robledo c/w home meds.  follow with PMD C/W bacid.

## 2018-12-23 NOTE — DISCHARGE NOTE ADULT - CARE PLAN
Principal Discharge DX:	Diverticulitis  Goal:	resolved  Assessment and plan of treatment:	C/W with  Cipro and flagyl.   F/u with  GI Dr. Robledo  Secondary Diagnosis:	Essential hypertension  Assessment and plan of treatment:	c/w home meds.  follow with PMD  Secondary Diagnosis:	Diabetes mellitus  Assessment and plan of treatment:	c/w home meds.  follow with PMD  Secondary Diagnosis:	Hyperlipidemia, unspecified hyperlipidemia type  Assessment and plan of treatment:	c/w home meds.  follow with PMD  Secondary Diagnosis:	Prophylactic measure  Assessment and plan of treatment:	C/W bacid.

## 2018-12-23 NOTE — DISCHARGE NOTE ADULT - MEDICATION SUMMARY - MEDICATIONS TO TAKE
I will START or STAY ON the medications listed below when I get home from the hospital:    Flagyl 500 mg oral tablet  -- 1 tab(s) by mouth 3 times a day   -- Do not drink alcoholic beverages when taking this medication.  Finish all this medication unless otherwise directed by prescriber.  May discolor urine or feces.    -- Indication: For Diverticulitis    ramipril  -- 5 milligram(s) by mouth once a day  -- Indication: For HTN (hypertension)    glimepiride  -- 2 milligram(s) by mouth once a day (at bedtime)  -- Indication: For Diabetes mellitus    atorvastatin  -- 10  by mouth once a day (at bedtime)  -- Indication: For Hyperlipidemia, unspecified hyperlipidemia type    lactobacillus acidophilus oral capsule  -- 1 cap(s) by mouth 3 times a day   -- Indication: For Prophylactic measure    ciprofloxacin 500 mg oral tablet  -- 1 tab(s) by mouth 2 times a day x 5 days   -- Avoid prolonged or excessive exposure to direct and/or artificial sunlight while taking this medication.  Check with your doctor before becoming pregnant.  Do not take dairy products, antacids, or iron preparations within one hour of this medication.  Finish all this medication unless otherwise directed by prescriber.  Medication should be taken with plenty of water.    -- Indication: For Diverticulitis

## 2018-12-23 NOTE — DISCHARGE NOTE ADULT - SECONDARY DIAGNOSIS.
Essential hypertension Diabetes mellitus Hyperlipidemia, unspecified hyperlipidemia type Prophylactic measure

## 2018-12-23 NOTE — DISCHARGE NOTE ADULT - CARE PROVIDER_API CALL
Rigoberto Robledo), Gastroenterology  [address]  [address] [place] [address]  Phone: [phone]  Fax: [phone]    Eitan Leon), Internal Medicine  [address] [place] [address]  Phone: [phone]  Fax: [phone]

## 2018-12-23 NOTE — DISCHARGE NOTE ADULT - PATIENT PORTAL LINK FT
You can access the [facility] Patient Portal, offered by [facility], by registering with the following website: [email or web address]

## 2019-02-07 NOTE — PATIENT PROFILE ADULT - NSPROGENPREVTRANSF_GEN_A_NUR
Abdominal Pain/Male





- HPI Summary


HPI Summary: 





This patient is a 80 year old male brought in by EMS with a CC of periumbilical 

pain that began suddenly at 1800 today. He rates the pain 10/10 in severity and 

states it radiates into his back. He also c/o nausea without vomiting.  He has 

had multiple abd surgeries but state none recently. 





- History of Current Complaint


Chief Complaint: EDAbdPain


Stated Complaint: BACK/CHEST PAIN


Hx Obtained From: Patient


Onset/Duration: Lasting Minutes, Still Present


Timing: Constant


Severity Initially: Severe


Severity Currently: Severe


Pain Intensity: 10


Pain Scale Used: 0-10 Numeric


Location: Umbilical


Radiates: Yes


Radiates to: Back


Associated Signs And Symptoms: Positive: Nausea.  Negative: Vomiting





- Allergies/Home Medications


Allergies/Adverse Reactions: 


 Allergies











Allergy/AdvReac Type Severity Reaction Status Date / Time


 


No Known Allergies Allergy   Verified 09/08/18 12:20














PMH/Surg Hx/FS Hx/Imm Hx


Endocrine/Hematology History: 


   Denies: Hx Anticoagulant Therapy, Hx Diabetes


Cardiovascular History: 


   Denies: Hx Hypertension, Hx Pacemaker/ICD


   Comment Only: Other Cardiovascular Problems/Disorders - MI


Respiratory History: 


   Denies: Hx Asthma, Hx Chronic Obstructive Pulmonary Disease (COPD), Hx Lung 

Cancer, Hx Pulmonary Embolism


GI History: 


   Denies: Hx Gastroesophageal Reflux Disease


 History: Reports: Hx Benign Prostatic Hyperplasia


   Comment Only: Other  Problems/Disorders - urine retention


Musculoskeletal History: Reports: Hx Back Problems


Sensory History: Reports: Hx Contacts or Glasses, Hx Legally Blind - Rt eye, Hx 

Hearing Problem


   Denies: Hx Cataracts, Hx Eye Injury, Hx Eye Prosthesis, Hx Glaucoma, Hx 

Macular Degeneration, Hx Deafness, Hx Hearing Aid


Opthamlomology History: Reports: Hx Contacts or Glasses, Hx Legally Blind - Rt 

eye


   Denies: Hx Cataracts, Hx Eye Injury, Hx Eye Prosthesis, Hx Glaucoma, Hx 

Macular Degeneration


Neurological History: Reports: Hx Headaches


Psychiatric History: 


   Denies: Hx Panic Disorder





- Surgical History


Surgery Procedure, Year, and Place: stents in heart, tonsils, appendix, hernia 

x9, gallbladder, right ankle, right femur


Infectious Disease History: No


Infectious Disease History: 


   Denies: Hx Clostridium Difficile, Hx Hepatitis, Hx of Known/Suspected MRSA, 

Hx Shingles, Hx Tuberculosis, Hx Known/Suspected VRE, Traveled Outside the [place] 

in Last 30 Days





- Family History


Known Family History: Positive: Cardiac Disease





- Social History


Alcohol Use: None


Substance Use Type: Reports: None


Smoking Status (MU): Former Smoker





Review of Systems


Negative: Fever


Positive: Abdominal Pain, Nausea.  Negative: Vomiting


All Other Systems Reviewed And Are Negative: Yes





Physical Exam





- Summary


Physical Exam Summary: 





VITAL SIGNS: Reviewed.


GENERAL: Patient is a well-developed and nourished male who is lying 

comfortable in the stretcher. Acute distress secondary to pain 


HEAD AND FACE: No signs of trauma. No ecchymosis, hematomas or skull 

depressions. No sinus tenderness.


EYES: PERRLA, EOMI x 2, No injected conjunctiva, no nystagmus.


EARS: Hearing grossly intact. Ear canals and tympanic membranes are within 

normal limits.


MOUTH: Oropharynx within normal limits.


NECK: Supple, trachea is midline, no adenopathy, no JVD, no carotid bruit, no c-

spine tenderness, neck with full ROM.


CHEST: Symmetric, no tenderness at palpation


LUNGS: Clear to auscultation bilaterally. No wheezing or crackles.


CVS: Regular rate and rhythm, S1 and S2 present, no murmurs or gallops 

appreciated.


ABDOMEN: Soft, pain in LLQ No signs of distention. No rebound no guarding, and 

no masses palpated. Bowel sounds are decreased. There are no bounding pulses in 

the epigastric region. There is an indwelling foely catheter.


EXTREMITIES: FROM in all major joints, no edema, no cyanosis or clubbing.


NEURO: Alert and oriented x 3. No acute neurological deficits. Speech is normal 

and follows commands.


SKIN: Dry and warm


 





Triage Information Reviewed: Yes


Vital Signs On Initial Exam: 


 Initial Vitals











Temp Pulse Resp BP Pulse Ox


 


 96.1 F   57   17   182/98   97 


 


 02/07/19 18:02  02/07/19 18:02  02/07/19 18:02  02/07/19 18:02  02/07/19 18:02











Vital Signs Reviewed: Yes





Diagnostics





- Vital Signs


 Vital Signs











  Temp Pulse Resp BP Pulse Ox


 


 02/07/19 18:02  96.1 F  57  17  182/98  97














- Laboratory


Result Diagrams: 


 02/08/19 06:57





 02/08/19 06:57


Lab Statement: Any lab studies that have been ordered have been reviewed, and 

results considered in the medical decision making process.





- CT


  ** CT ABD/Pelvis


CT Interpretation Completed By: Radiologist


Summary of CT Findings: 1. No CT findings to correlate with patient's 

symptomatology.  2. Recurrent anterior abdominal wall hernias one containing fat

, other.  containing a partial loop of colon. No obstruction or strangulation.  

3. Bosniak type I renal cysts. No followup indicated.  4. Celiac artery 

aneurysm.  DR Suárez has reviewed this report.





- EKG


  ** 1916


Cardiac Rate: Other Rate


EKG Rhythm: Atrial Fibrillation - at 72 bpm


Summary of EKG Findings: no st elevations





Re-Evaluation





- Re-Evaluation


  ** First Eval


Re-Evaluation Time: 21:14


Comment: The patient is not having any pain at this time.





Abdominal Pain Fem Course/Dx





- Course


Assessment/Plan: Blood work without any significant abnormality except for 

sodium 134, glucose of 1:30, lactic acid is 2.5, total bili is 1.5, troponin 

0.00, urinalysis positive for UTI.  In the ED the patient was given IV fluids, 

Zofran for nausea and morphine for pain.  In the ED course and the patient was 

given Rocephin for in the UTI.  In reassessment of the abdominal pain at 9:15 

PM the patient reports that the pain has resolved.  However the patient is an 

elderly gentleman who had nausea and vomiting and has a UTI therefore I discuss 

my physical exam and findings with and Dr. Amin from the hospitalist services 

who accepted the patient for admission.  At this point the patient is 

hemodynamically stable alert and oriented 3.





- Diagnoses


Differential Diagnosis/HQI/PQRI: Diverticulitis, Renal Colic, Urinary Tract 

Infection


Provider Diagnoses: 


 Lower abdominal pain, UTI (urinary tract infection)








- Provider Notifications


Discussed Care Of Patient With: Janett Amin


Time Discussed With Above Provider: 21:15


Instructed by Provider To: Admit As Inpatient





Discharge





- Sign-Out/Discharge


Documenting (check all that apply): Patient Departure - admitted 


Patient Received Moderate/Deep Sedation with Procedure: No





- Discharge Plan


Condition: Fair


Disposition: ADMITTED TO [facility]





- Billing Disposition and Condition


Condition: FAIR


Disposition: Admitted to [facility]





- Attestation Statements


Document Initiated by Scribe: Yes


Documenting Scribe: Saji Pacheco 


Provider For Whom Scribe is Documenting (Include Credential): Jose Suárez MD 


Scribe Attestation: 


ISaji , scribed for Jose Suárez MD  on 02/08/19 at 2057. 


Scribe Documentation Reviewed: Yes


Provider Attestation: 


The documentation as recorded by the scribe, Saji Pacheco  accurately reflects 

the service I personally performed and the decisions made by me, Jose Suárez MD 


Status of Scribabdulaziz Document: Viewed
no

## 2019-03-13 PROBLEM — E11.9 TYPE 2 DIABETES MELLITUS WITHOUT COMPLICATIONS: Chronic | Status: ACTIVE | Noted: 2018-12-21

## 2019-03-13 PROBLEM — I10 ESSENTIAL (PRIMARY) HYPERTENSION: Chronic | Status: ACTIVE | Noted: 2018-12-21

## 2019-07-24 NOTE — PATIENT PROFILE ADULT. - NSSUBSTANCEUSE_GEN_ALL_CORE_SD
Spoke to patient to inquire if she is still having surgery. Per patient she has cancelled her surgery until her Lupus is under control.  She will keep our office informed of when she reschedules her surgery.  She will have her repeat K+ lab done tomorrow.    Dr. Esteves provided above information.   caffeine

## 2020-08-21 NOTE — ED ADULT NURSE NOTE - TEMPERATURE IN CELSIUS (DEGREES C)
Initiate Treatment: Metronidazole cream once daily \\nHydrocortisone 2.5% cream once at bedtime
Detail Level: Zone
Continue Regimen: Doxycycline 50mg once a day (patient has prescription at home)
36.5

## 2020-08-26 ENCOUNTER — EMERGENCY (EMERGENCY)
Facility: HOSPITAL | Age: 65
LOS: 1 days | Discharge: LEFT BEFORE DISCHARGE | End: 2020-08-26
Attending: EMERGENCY MEDICINE | Admitting: EMERGENCY MEDICINE
Payer: COMMERCIAL

## 2020-08-26 ENCOUNTER — TRANSCRIPTION ENCOUNTER (OUTPATIENT)
Age: 65
End: 2020-08-26

## 2020-08-26 VITALS
TEMPERATURE: 98 F | RESPIRATION RATE: 19 BRPM | SYSTOLIC BLOOD PRESSURE: 144 MMHG | HEART RATE: 61 BPM | OXYGEN SATURATION: 100 % | DIASTOLIC BLOOD PRESSURE: 85 MMHG

## 2020-08-26 VITALS
HEART RATE: 199 BPM | DIASTOLIC BLOOD PRESSURE: 86 MMHG | SYSTOLIC BLOOD PRESSURE: 122 MMHG | WEIGHT: 210.1 LBS | OXYGEN SATURATION: 99 % | RESPIRATION RATE: 21 BRPM

## 2020-08-26 DIAGNOSIS — Z98.890 OTHER SPECIFIED POSTPROCEDURAL STATES: Chronic | ICD-10-CM

## 2020-08-26 LAB
ALBUMIN SERPL ELPH-MCNC: 3.8 G/DL — SIGNIFICANT CHANGE UP (ref 3.3–5)
ALP SERPL-CCNC: 71 U/L — SIGNIFICANT CHANGE UP (ref 40–120)
ALT FLD-CCNC: 44 U/L — SIGNIFICANT CHANGE UP (ref 12–78)
ANION GAP SERPL CALC-SCNC: 6 MMOL/L — SIGNIFICANT CHANGE UP (ref 5–17)
APTT BLD: 31.4 SEC — SIGNIFICANT CHANGE UP (ref 27.5–35.5)
AST SERPL-CCNC: 25 U/L — SIGNIFICANT CHANGE UP (ref 15–37)
BASOPHILS # BLD AUTO: 0.05 K/UL — SIGNIFICANT CHANGE UP (ref 0–0.2)
BASOPHILS NFR BLD AUTO: 0.5 % — SIGNIFICANT CHANGE UP (ref 0–2)
BILIRUB SERPL-MCNC: 0.5 MG/DL — SIGNIFICANT CHANGE UP (ref 0.2–1.2)
BUN SERPL-MCNC: 14 MG/DL — SIGNIFICANT CHANGE UP (ref 7–23)
CALCIUM SERPL-MCNC: 8.6 MG/DL — SIGNIFICANT CHANGE UP (ref 8.5–10.1)
CHLORIDE SERPL-SCNC: 108 MMOL/L — SIGNIFICANT CHANGE UP (ref 96–108)
CO2 SERPL-SCNC: 27 MMOL/L — SIGNIFICANT CHANGE UP (ref 22–31)
CREAT SERPL-MCNC: 1.1 MG/DL — SIGNIFICANT CHANGE UP (ref 0.5–1.3)
EOSINOPHIL # BLD AUTO: 0.25 K/UL — SIGNIFICANT CHANGE UP (ref 0–0.5)
EOSINOPHIL NFR BLD AUTO: 2.7 % — SIGNIFICANT CHANGE UP (ref 0–6)
GLUCOSE SERPL-MCNC: 154 MG/DL — HIGH (ref 70–99)
HCT VFR BLD CALC: 45.6 % — SIGNIFICANT CHANGE UP (ref 39–50)
HGB BLD-MCNC: 15.5 G/DL — SIGNIFICANT CHANGE UP (ref 13–17)
IMM GRANULOCYTES NFR BLD AUTO: 0.3 % — SIGNIFICANT CHANGE UP (ref 0–1.5)
INR BLD: 1.04 RATIO — SIGNIFICANT CHANGE UP (ref 0.88–1.16)
LIDOCAIN IGE QN: 129 U/L — SIGNIFICANT CHANGE UP (ref 73–393)
LYMPHOCYTES # BLD AUTO: 3.02 K/UL — SIGNIFICANT CHANGE UP (ref 1–3.3)
LYMPHOCYTES # BLD AUTO: 32.6 % — SIGNIFICANT CHANGE UP (ref 13–44)
MAGNESIUM SERPL-MCNC: 2.1 MG/DL — SIGNIFICANT CHANGE UP (ref 1.6–2.6)
MCHC RBC-ENTMCNC: 30.9 PG — SIGNIFICANT CHANGE UP (ref 27–34)
MCHC RBC-ENTMCNC: 34 GM/DL — SIGNIFICANT CHANGE UP (ref 32–36)
MCV RBC AUTO: 91 FL — SIGNIFICANT CHANGE UP (ref 80–100)
MONOCYTES # BLD AUTO: 0.88 K/UL — SIGNIFICANT CHANGE UP (ref 0–0.9)
MONOCYTES NFR BLD AUTO: 9.5 % — SIGNIFICANT CHANGE UP (ref 2–14)
NEUTROPHILS # BLD AUTO: 5.02 K/UL — SIGNIFICANT CHANGE UP (ref 1.8–7.4)
NEUTROPHILS NFR BLD AUTO: 54.4 % — SIGNIFICANT CHANGE UP (ref 43–77)
NRBC # BLD: 0 /100 WBCS — SIGNIFICANT CHANGE UP (ref 0–0)
PLATELET # BLD AUTO: 213 K/UL — SIGNIFICANT CHANGE UP (ref 150–400)
POTASSIUM SERPL-MCNC: 3.3 MMOL/L — LOW (ref 3.5–5.3)
POTASSIUM SERPL-SCNC: 3.3 MMOL/L — LOW (ref 3.5–5.3)
PROT SERPL-MCNC: 7.5 G/DL — SIGNIFICANT CHANGE UP (ref 6–8.3)
PROTHROM AB SERPL-ACNC: 12.2 SEC — SIGNIFICANT CHANGE UP (ref 10.6–13.6)
RBC # BLD: 5.01 M/UL — SIGNIFICANT CHANGE UP (ref 4.2–5.8)
RBC # FLD: 12.2 % — SIGNIFICANT CHANGE UP (ref 10.3–14.5)
SODIUM SERPL-SCNC: 141 MMOL/L — SIGNIFICANT CHANGE UP (ref 135–145)
TROPONIN I SERPL-MCNC: 0.08 NG/ML — HIGH (ref 0.01–0.04)
TROPONIN I SERPL-MCNC: <.015 NG/ML — SIGNIFICANT CHANGE UP (ref 0.01–0.04)
WBC # BLD: 9.25 K/UL — SIGNIFICANT CHANGE UP (ref 3.8–10.5)
WBC # FLD AUTO: 9.25 K/UL — SIGNIFICANT CHANGE UP (ref 3.8–10.5)

## 2020-08-26 PROCEDURE — 85610 PROTHROMBIN TIME: CPT

## 2020-08-26 PROCEDURE — 99291 CRITICAL CARE FIRST HOUR: CPT

## 2020-08-26 PROCEDURE — 84484 ASSAY OF TROPONIN QUANT: CPT

## 2020-08-26 PROCEDURE — 71045 X-RAY EXAM CHEST 1 VIEW: CPT | Mod: 26

## 2020-08-26 PROCEDURE — 93005 ELECTROCARDIOGRAM TRACING: CPT

## 2020-08-26 PROCEDURE — 99291 CRITICAL CARE FIRST HOUR: CPT | Mod: 25

## 2020-08-26 PROCEDURE — 83735 ASSAY OF MAGNESIUM: CPT

## 2020-08-26 PROCEDURE — 85730 THROMBOPLASTIN TIME PARTIAL: CPT

## 2020-08-26 PROCEDURE — 96374 THER/PROPH/DIAG INJ IV PUSH: CPT

## 2020-08-26 PROCEDURE — 83690 ASSAY OF LIPASE: CPT

## 2020-08-26 PROCEDURE — 93010 ELECTROCARDIOGRAM REPORT: CPT

## 2020-08-26 PROCEDURE — 36415 COLL VENOUS BLD VENIPUNCTURE: CPT

## 2020-08-26 PROCEDURE — 80053 COMPREHEN METABOLIC PANEL: CPT

## 2020-08-26 PROCEDURE — 85027 COMPLETE CBC AUTOMATED: CPT

## 2020-08-26 PROCEDURE — 71045 X-RAY EXAM CHEST 1 VIEW: CPT

## 2020-08-26 RX ORDER — POTASSIUM CHLORIDE 20 MEQ
40 PACKET (EA) ORAL ONCE
Refills: 0 | Status: COMPLETED | OUTPATIENT
Start: 2020-08-26 | End: 2020-08-26

## 2020-08-26 RX ORDER — SODIUM CHLORIDE 9 MG/ML
1000 INJECTION INTRAMUSCULAR; INTRAVENOUS; SUBCUTANEOUS ONCE
Refills: 0 | Status: COMPLETED | OUTPATIENT
Start: 2020-08-26 | End: 2020-08-26

## 2020-08-26 RX ORDER — METOPROLOL TARTRATE 50 MG
1 TABLET ORAL
Qty: 14 | Refills: 0
Start: 2020-08-26 | End: 2020-09-08

## 2020-08-26 RX ORDER — ADENOSINE 3 MG/ML
6 INJECTION INTRAVENOUS ONCE
Refills: 0 | Status: COMPLETED | OUTPATIENT
Start: 2020-08-26 | End: 2020-08-26

## 2020-08-26 RX ADMIN — ADENOSINE 6 MILLIGRAM(S): 3 INJECTION INTRAVENOUS at 04:30

## 2020-08-26 RX ADMIN — SODIUM CHLORIDE 1000 MILLILITER(S): 9 INJECTION INTRAMUSCULAR; INTRAVENOUS; SUBCUTANEOUS at 04:30

## 2020-08-26 RX ADMIN — Medication 40 MILLIEQUIVALENT(S): at 05:14

## 2020-08-26 NOTE — ED PROVIDER NOTE - PATIENT PORTAL LINK FT
You can access the FollowMyHealth Patient Portal offered by [facility] by registering at the following website: [email or web address]. By joining [employer]’s FollowMyHealth portal, you will also be able to view your health information using other applications (apps) compatible with our system.

## 2020-08-26 NOTE — ED PROVIDER NOTE - NS ED ROS FT

## 2020-08-26 NOTE — ED PROVIDER NOTE - OBJECTIVE STATEMENT
66 yo male hx of DM, HTN, HLD c/o sudden onset of palpitations 1 hour ago, was browsing on his phone on facebook, no chest pain, no shortness of breath.  Nonradiating, moderate to severe, no medications taken prior to arrival.

## 2020-08-26 NOTE — ED ADULT TRIAGE NOTE - CHIEF COMPLAINT QUOTE
States he feels his heart racing, C/O dizziness/lightheadedness. Per patient last time he had this he was in high school.

## 2020-08-26 NOTE — ED ADULT NURSE NOTE - CHPI ED NUR SYMPTOMS NEG
no congestion/no dizziness/no back pain/no shortness of breath/no diaphoresis/no syncope/no chills/no chest pain/no fever/no vomiting/no nausea

## 2020-08-26 NOTE — CONSULT NOTE ADULT - SUBJECTIVE AND OBJECTIVE BOX
CHIEF COMPLAINT: Patient is a 65y old  Male who presents with a chief complaint of palpitations. patient had difficulty sleeping was viewing facebookon his phone and suddenly had rapid heart beat without other associated symptoms came to ER found to be in PSVT terminated with adenosine    HPI: 2 prior eposodes one in high school and one in college. Had one drink of Cognac last evening and 2 cups of coffee. +hyperlipiemia, brother had ablation a number of years ago. no MI, , , Smoking      PAST MEDICAL & SURGICAL HISTORY:  Diabetes mellitus  HTN (hypertension)  Hyperlipidemia  H/O right inguinal hernia repair  S/P ear surgery      SOCIAL HISTORY:    FAMILY HISTORY: FAMILY HISTORY:  No pertinent family history in first degree relatives      MEDICATIONS  (STANDING):    MEDICATIONS  (PRN):      Allergies    No Known Allergies    Intolerances        REVIEW OF SYSTEMS:    CONSTITUTIONAL: No weakness, fevers or chills  EYES: No visual changes, No diplopia  ENMT: No throat pain , No exudate  NECK: No pain or stiffness  RESPIRATORY: No cough, wheezing, hemoptysis; No shortness of breath  CARDIOVASCULAR: No chest pain or palpitations  GASTROINTESTINAL: No abdominal pain. No nausea, vomiting, or hematemesis; No diarrhea or constipation. No melena or hematochezia.  GENITOURINARY: No dysuria, frequency or hematuria  NEUROLOGICAL: No numbness or weakness  SKIN: No itching or rash  All other review of systems is negative unless indicated above    VITAL SIGNS:   Vital Signs Last 24 Hrs  T(C): 36.8 (26 Aug 2020 07:22), Max: 36.9 (26 Aug 2020 04:33)  T(F): 98.2 (26 Aug 2020 07:22), Max: 98.4 (26 Aug 2020 04:33)  HR: 67 (26 Aug 2020 07:22) (67 - 199)  BP: 112/62 (26 Aug 2020 07:22) (112/62 - 144/83)  BP(mean): --  RR: 16 (26 Aug 2020 07:22) (16 - 21)  SpO2: 99% (26 Aug 2020 07:22) (97% - 99%)    I&O's Summary      PHYSICAL EXAM:    Constitutional: NAD, awake and alert, well-developed  Eyes:  EOMI,  Pupils round, no lesions  ENMT: no exudate or erythema  Pulmonary: Non-labored, breath sounds are clear bilaterally, No wheezing, rales or rhonchi  Cardiovascular: PMI not palpable non-displaced Regular S1 and S2, no murmurs, rubs, gallops or clicks  Gastrointestinal: Bowel Sounds present, soft, nontender.   Lymph: No peripheral edema. No cervical lymphadenopathy.  Neurological: Alert, no focal deficits  Skin: No rashes. Changes of chronic venous stasis. No cyanosis.  Psych:  Mood & affect appropriate Confused.    LABS: All Labs Reviewed:                        15.5   9.25  )-----------( 213      ( 26 Aug 2020 04:25 )             45.6     26 Aug 2020 04:25    141    |  108    |  14     ----------------------------<  154    3.3     |  27     |  1.10     Ca    8.6        26 Aug 2020 04:25  Mg     2.1       26 Aug 2020 04:25    TPro  7.5    /  Alb  3.8    /  TBili  0.5    /  DBili  x      /  AST  25     /  ALT  44     /  AlkPhos  71     26 Aug 2020 04:25    PT/INR - ( 26 Aug 2020 04:25 )   PT: 12.2 sec;   INR: 1.04 ratio         PTT - ( 26 Aug 2020 04:25 )  PTT:31.4 sec  CARDIAC MARKERS ( 26 Aug 2020 04:25 )  <.015 ng/mL / x     / x     / x     / x          Blood Culture:         RADIOLOGY/EKG:

## 2020-08-26 NOTE — ED PROVIDER NOTE - CARE PROVIDER_API CALL
Jean Carlos Nicole (MD)  Cardiovascular Disease  [address]rd [place] [address]  Phone: [phone]  Fax: [phone]  Follow Up Time: 1-3 Days

## 2020-08-26 NOTE — ED ADULT NURSE NOTE - FINAL NURSING ELECTRONIC SIGNATURE
PATIENT WOULD LIKE A RETURN CALL TO DISCUSS THE NEW DOSAGE OF LOSARTAN-HCTZ. SHE STATES THAT HER BP IS REALLY HIGH AND SHE IS FEELING WEIRD. SHE IS CONCERNED THAT SOMETHING MAY BE WRONG.    CALL BACK [phone]   26-Aug-2020 12:21

## 2020-08-26 NOTE — CONSULT NOTE ADULT - ASSESSMENT
PSVT  would check second troponin  if negative discharge home on toprol xl 25 mg  ov with Dr Nicole within one week  Refer to Dr David Danielle EPS for evaluation for ablation

## 2020-08-26 NOTE — ED ADULT NURSE NOTE - CHPI ED NUR SYMPTOMS POS
Message from MyChart:  Original authorizing provider: ARDEN Coelho CNP would like a refill of the following medications:  gabapentin (NEURONTIN) 300 MG capsule [ARDEN Coelho CNP]    Preferred pharmacy: [facility] [id number] [place] [address] & [address]    Comment:     PALPITATIONS

## 2020-08-26 NOTE — ED ADULT NURSE NOTE - OBJECTIVE STATEMENT
66 y/o M patient presents to ED from home c/o palpitations 1 hour prior to arrival to ED. Patient reports he was awake in bed and on phone looking at facebook. Patient reports "I looked at something that gave me anxiety." As per patient he felt sudden palpitations. Patient denies chest pain. Patient denies any previous similar episodes. Patient A&Ox4. lungs CTA. skin warm and intact. abdomen soft. Patient denies HA, dizziness, SOB, abdominal pain, N/V/D. Safety and comfort measures provided and maintained.

## 2020-08-26 NOTE — ED PROVIDER NOTE - PHYSICAL EXAMINATION
Gen: Alert, NAD  Head/eyes: NC/AT, PERRL  ENT: airway patent  Neck: supple, no tenderness/meningismus/JVD, Trachea midline  Pulm/lung: Bilateral clear BS, normal resp effort, no wheeze/stridor/retractions  CV/heart: +tachycardia no M/R/G, +2 dist pulses (radial, pedal DP/PT, popliteal)  GI/Abd: soft, NT/ND, +BS, no guarding/rebound tenderness  Musculoskeletal: no edema/erythema/cyanosis, FROM in all extremities, no C/T/L spine ttp  Skin: no rash, no vesicles, no petechaie, no ecchymosis, no swelling  Neuro: AAOx3, CN 2-12 intact, normal sensation, 5/5 motor strength in all extremities, normal gait, no dysmetria

## 2020-08-26 NOTE — ED PROVIDER NOTE - PROGRESS NOTE DETAILS
now in NSR on monitor at 70's, feels well after adenosine discussed case with Dr. Hickman will see patient this morning, recommending 2nd troponin in 6 hours figueroa (cards) seen eval pt, cleared for d/c with toprol XL 25mg if 1000 trop neg results d/w figueroa (cards), he relates elev trop likely due to elev rate, but recommends admit echo serial enz. Had an at length discussion with patient.  Patient wishes to leave at this time.  The patient understands that they are leaving against medical advice despite the risk of missing a potentially serious diagnosis which may lead to injury, disability and/or death.  I discussed with the patient which tests would need to be performed and what type of monitoring would be necessary for the patient as well.  I was unable to convince patient to stay for further workup.  The patient was given an opportunity to ask any questions as well.   The patient demonstrates understanding of all risks, is awake and alert and demonstrates competance to make medical decisions.  The patient understands that they may return at any time if desired. Discussed the importance of close, prompt medical follow-up.

## 2020-12-11 NOTE — PATIENT PROFILE ADULT - NSPROSPIRITUALVALUESFT_GEN_A_NUR
Oriental orthodox Tranexamic Acid Pregnancy And Lactation Text: It is unknown if this medication is safe during pregnancy or breast feeding.
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
## 2021-09-16 ENCOUNTER — INPATIENT (INPATIENT)
Facility: HOSPITAL | Age: 66
LOS: 0 days | Discharge: HOME | DRG: 309 | End: 2021-09-17
Attending: INTERNAL MEDICINE | Admitting: INTERNAL MEDICINE
Payer: COMMERCIAL

## 2021-09-16 VITALS
SYSTOLIC BLOOD PRESSURE: 80 MMHG | HEIGHT: 66 IN | DIASTOLIC BLOOD PRESSURE: 40 MMHG | OXYGEN SATURATION: 100 % | RESPIRATION RATE: 20 BRPM | HEART RATE: 198 BPM | TEMPERATURE: 97 F

## 2021-09-16 DIAGNOSIS — R77.8 OTHER SPECIFIED ABNORMALITIES OF PLASMA PROTEINS: ICD-10-CM

## 2021-09-16 DIAGNOSIS — Z98.890 OTHER SPECIFIED POSTPROCEDURAL STATES: Chronic | ICD-10-CM

## 2021-09-16 DIAGNOSIS — I10 ESSENTIAL (PRIMARY) HYPERTENSION: ICD-10-CM

## 2021-09-16 DIAGNOSIS — E11.9 TYPE 2 DIABETES MELLITUS WITHOUT COMPLICATIONS: ICD-10-CM

## 2021-09-16 DIAGNOSIS — I47.1 SUPRAVENTRICULAR TACHYCARDIA: ICD-10-CM

## 2021-09-16 DIAGNOSIS — E78.5 HYPERLIPIDEMIA, UNSPECIFIED: ICD-10-CM

## 2021-09-16 DIAGNOSIS — Z29.9 ENCOUNTER FOR PROPHYLACTIC MEASURES, UNSPECIFIED: ICD-10-CM

## 2021-09-16 LAB
ALBUMIN SERPL ELPH-MCNC: 4 G/DL — SIGNIFICANT CHANGE UP (ref 3.3–5)
ALP SERPL-CCNC: 66 U/L — SIGNIFICANT CHANGE UP (ref 40–120)
ALT FLD-CCNC: 43 U/L — SIGNIFICANT CHANGE UP (ref 12–78)
ANION GAP SERPL CALC-SCNC: 9 MMOL/L — SIGNIFICANT CHANGE UP (ref 5–17)
APTT BLD: 30.3 SEC — SIGNIFICANT CHANGE UP (ref 27.5–35.5)
AST SERPL-CCNC: 21 U/L — SIGNIFICANT CHANGE UP (ref 15–37)
BASOPHILS # BLD AUTO: 0.06 K/UL — SIGNIFICANT CHANGE UP (ref 0–0.2)
BASOPHILS NFR BLD AUTO: 0.6 % — SIGNIFICANT CHANGE UP (ref 0–2)
BILIRUB SERPL-MCNC: 0.6 MG/DL — SIGNIFICANT CHANGE UP (ref 0.2–1.2)
BUN SERPL-MCNC: 16 MG/DL — SIGNIFICANT CHANGE UP (ref 7–23)
CALCIUM SERPL-MCNC: 8.6 MG/DL — SIGNIFICANT CHANGE UP (ref 8.5–10.1)
CHLORIDE SERPL-SCNC: 105 MMOL/L — SIGNIFICANT CHANGE UP (ref 96–108)
CK MB BLD-MCNC: 2.6 % — SIGNIFICANT CHANGE UP (ref 0–3.5)
CK MB CFR SERPL CALC: 1.2 NG/ML — SIGNIFICANT CHANGE UP (ref 0–3.6)
CK MB CFR SERPL CALC: 3.1 NG/ML — SIGNIFICANT CHANGE UP (ref 0–3.6)
CK SERPL-CCNC: 117 U/L — SIGNIFICANT CHANGE UP (ref 26–308)
CO2 SERPL-SCNC: 24 MMOL/L — SIGNIFICANT CHANGE UP (ref 22–31)
CREAT SERPL-MCNC: 1.1 MG/DL — SIGNIFICANT CHANGE UP (ref 0.5–1.3)
EOSINOPHIL # BLD AUTO: 0.37 K/UL — SIGNIFICANT CHANGE UP (ref 0–0.5)
EOSINOPHIL NFR BLD AUTO: 3.6 % — SIGNIFICANT CHANGE UP (ref 0–6)
GLUCOSE SERPL-MCNC: 190 MG/DL — HIGH (ref 70–99)
HCT VFR BLD CALC: 50.2 % — HIGH (ref 39–50)
HGB BLD-MCNC: 16.7 G/DL — SIGNIFICANT CHANGE UP (ref 13–17)
IMM GRANULOCYTES NFR BLD AUTO: 0.6 % — SIGNIFICANT CHANGE UP (ref 0–1.5)
INR BLD: 1.02 RATIO — SIGNIFICANT CHANGE UP (ref 0.88–1.16)
LYMPHOCYTES # BLD AUTO: 2.96 K/UL — SIGNIFICANT CHANGE UP (ref 1–3.3)
LYMPHOCYTES # BLD AUTO: 28.5 % — SIGNIFICANT CHANGE UP (ref 13–44)
MCHC RBC-ENTMCNC: 30.3 PG — SIGNIFICANT CHANGE UP (ref 27–34)
MCHC RBC-ENTMCNC: 33.3 GM/DL — SIGNIFICANT CHANGE UP (ref 32–36)
MCV RBC AUTO: 91.1 FL — SIGNIFICANT CHANGE UP (ref 80–100)
MONOCYTES # BLD AUTO: 0.93 K/UL — HIGH (ref 0–0.9)
MONOCYTES NFR BLD AUTO: 9 % — SIGNIFICANT CHANGE UP (ref 2–14)
NEUTROPHILS # BLD AUTO: 6 K/UL — SIGNIFICANT CHANGE UP (ref 1.8–7.4)
NEUTROPHILS NFR BLD AUTO: 57.7 % — SIGNIFICANT CHANGE UP (ref 43–77)
NRBC # BLD: 0 /100 WBCS — SIGNIFICANT CHANGE UP (ref 0–0)
NT-PROBNP SERPL-SCNC: 62 PG/ML — SIGNIFICANT CHANGE UP (ref 0–125)
PLATELET # BLD AUTO: 244 K/UL — SIGNIFICANT CHANGE UP (ref 150–400)
POTASSIUM SERPL-MCNC: 3.6 MMOL/L — SIGNIFICANT CHANGE UP (ref 3.5–5.3)
POTASSIUM SERPL-SCNC: 3.6 MMOL/L — SIGNIFICANT CHANGE UP (ref 3.5–5.3)
PROT SERPL-MCNC: 7.9 G/DL — SIGNIFICANT CHANGE UP (ref 6–8.3)
PROTHROM AB SERPL-ACNC: 11.9 SEC — SIGNIFICANT CHANGE UP (ref 10.6–13.6)
RBC # BLD: 5.51 M/UL — SIGNIFICANT CHANGE UP (ref 4.2–5.8)
RBC # FLD: 12.4 % — SIGNIFICANT CHANGE UP (ref 10.3–14.5)
SARS-COV-2 RNA SPEC QL NAA+PROBE: SIGNIFICANT CHANGE UP
SODIUM SERPL-SCNC: 138 MMOL/L — SIGNIFICANT CHANGE UP (ref 135–145)
TROPONIN I SERPL-MCNC: 0.25 NG/ML — HIGH (ref 0.01–0.04)
TROPONIN I SERPL-MCNC: 0.42 NG/ML — HIGH (ref 0.01–0.04)
TROPONIN I SERPL-MCNC: <.015 NG/ML — SIGNIFICANT CHANGE UP (ref 0.01–0.04)
TSH SERPL-MCNC: 2.01 UIU/ML — SIGNIFICANT CHANGE UP (ref 0.36–3.74)
WBC # BLD: 10.38 K/UL — SIGNIFICANT CHANGE UP (ref 3.8–10.5)
WBC # FLD AUTO: 10.38 K/UL — SIGNIFICANT CHANGE UP (ref 3.8–10.5)

## 2021-09-16 PROCEDURE — 93010 ELECTROCARDIOGRAM REPORT: CPT | Mod: 76

## 2021-09-16 PROCEDURE — 99223 1ST HOSP IP/OBS HIGH 75: CPT | Mod: GC

## 2021-09-16 PROCEDURE — 99285 EMERGENCY DEPT VISIT HI MDM: CPT

## 2021-09-16 PROCEDURE — 71045 X-RAY EXAM CHEST 1 VIEW: CPT | Mod: 26

## 2021-09-16 RX ORDER — GLIMEPIRIDE 1 MG
2 TABLET ORAL
Qty: 0 | Refills: 0 | DISCHARGE

## 2021-09-16 RX ORDER — METOPROLOL TARTRATE 50 MG
25 TABLET ORAL DAILY
Refills: 0 | Status: DISCONTINUED | OUTPATIENT
Start: 2021-09-16 | End: 2021-09-16

## 2021-09-16 RX ORDER — LISINOPRIL 2.5 MG/1
20 TABLET ORAL DAILY
Refills: 0 | Status: DISCONTINUED | OUTPATIENT
Start: 2021-09-16 | End: 2021-09-17

## 2021-09-16 RX ORDER — ENOXAPARIN SODIUM 100 MG/ML
40 INJECTION SUBCUTANEOUS DAILY
Refills: 0 | Status: DISCONTINUED | OUTPATIENT
Start: 2021-09-16 | End: 2021-09-17

## 2021-09-16 RX ORDER — LISINOPRIL 2.5 MG/1
20 TABLET ORAL DAILY
Refills: 0 | Status: DISCONTINUED | OUTPATIENT
Start: 2021-09-16 | End: 2021-09-16

## 2021-09-16 RX ORDER — ATORVASTATIN CALCIUM 80 MG/1
40 TABLET, FILM COATED ORAL AT BEDTIME
Refills: 0 | Status: DISCONTINUED | OUTPATIENT
Start: 2021-09-16 | End: 2021-09-17

## 2021-09-16 RX ORDER — INFLUENZA VIRUS VACCINE 15; 15; 15; 15 UG/.5ML; UG/.5ML; UG/.5ML; UG/.5ML
0.5 SUSPENSION INTRAMUSCULAR ONCE
Refills: 0 | Status: DISCONTINUED | OUTPATIENT
Start: 2021-09-16 | End: 2021-09-17

## 2021-09-16 RX ORDER — ATORVASTATIN CALCIUM 80 MG/1
10 TABLET, FILM COATED ORAL
Qty: 0 | Refills: 0 | DISCHARGE

## 2021-09-16 RX ORDER — ADENOSINE 3 MG/ML
6 INJECTION INTRAVENOUS ONCE
Refills: 0 | Status: COMPLETED | OUTPATIENT
Start: 2021-09-16 | End: 2021-09-16

## 2021-09-16 RX ORDER — ASPIRIN/CALCIUM CARB/MAGNESIUM 324 MG
325 TABLET ORAL ONCE
Refills: 0 | Status: COMPLETED | OUTPATIENT
Start: 2021-09-16 | End: 2021-09-16

## 2021-09-16 RX ORDER — METOPROLOL TARTRATE 50 MG
25 TABLET ORAL
Refills: 0 | Status: DISCONTINUED | OUTPATIENT
Start: 2021-09-16 | End: 2021-09-17

## 2021-09-16 RX ADMIN — Medication 325 MILLIGRAM(S): at 13:35

## 2021-09-16 RX ADMIN — ADENOSINE 6 MILLIGRAM(S): 3 INJECTION INTRAVENOUS at 09:33

## 2021-09-16 RX ADMIN — ATORVASTATIN CALCIUM 40 MILLIGRAM(S): 80 TABLET, FILM COATED ORAL at 23:14

## 2021-09-16 RX ADMIN — Medication 25 MILLIGRAM(S): at 17:50

## 2021-09-16 NOTE — H&P ADULT - PROBLEM SELECTOR PLAN 3
Hold oral agents  Start low dose coverage scale  Check fingerstick blood glucose levels qAc and qhs, hypoglycemia protolcol  Start carb consistent diet Chronic, stable   Well controlled with diet and exercise; not on medication   Check fingerstick, blood glucose levels qAc and qhs, hypoglycemia protolcol  Start carb consistent diet Chronic, stable   Well controlled with diet and exercise; not on medication   Check fingerstick, blood glucose levels qAc and qhs, hypoglycemia protocol  Follow up HgbA1c   Start carb consistent diet w/ evening snack

## 2021-09-16 NOTE — H&P ADULT - NSICDXPASTMEDICALHX_GEN_ALL_CORE_FT
PAST MEDICAL HISTORY:  Diabetes mellitus     HTN (hypertension)     Hyperlipidemia      Area M Indication Text: Tumors in this location are included in Area M (cheek, forehead, scalp, neck, jawline and pretibial skin).  Mohs surgery is indicated for tumors 1 cm or larger in these anatomic locations.

## 2021-09-16 NOTE — H&P ADULT - NSHPREVIEWOFSYSTEMS_GEN_ALL_CORE
Constitutional: denies fever, chills, sweating  HEENT: denies headache or lightheadedness,+ DIZZINESS   Respiratory: +SOB, denies cough, or wheezing  Cardiovascular: +CP, denies palpitations  Gastrointestinal: denies nausea, vomiting, diarrhea, constipation, abdominal pain, or bloody stools  Musculoskeletal: denies muscle aches, joint swelling, or muscle weakness  Neurologic: denies loss of sensation, numbness, or tingling  ROS negative except as noted above

## 2021-09-16 NOTE — CONSULT NOTE ADULT - SUBJECTIVE AND OBJECTIVE BOX
[facility] Cardiology    CHIEF COMPLAINT: Patient is a 66y old  Male PMH elevated BMI, SVT on metoprolol 25 qam, seen by EP (Dr Danielle) who presents with a chief complaint of palpitations despite taking BB at home. 12 lead EKG SVT/AVNRT? 180s s/p 6 mg IV adenosine now NSR 70s. Pt had some "heart burn" with SVT, now resolved.     HPI:    PAST MEDICAL & SURGICAL HISTORY:  Hyperlipidemia    HTN (hypertension)    Diabetes mellitus    S/P ear surgery    H/O right inguinal hernia repair      SOCIAL HISTORY: no tobacco  FAMILY HISTORY:  No pertinent family history in first degree relatives     HTN  MEDICATIONS  (STANDING):    MEDICATIONS  (PRN):    Allergies    No Known Allergies    Intolerances        REVIEW OF SYSTEMS:  CONSTITUTIONAL: No weakness, no fevers   EYES/ENT: No visual changes  NECK: No pain or stiffness  RESPIRATORY: No shortness of breath  CARDIOVASCULAR: No chest pain   + palpitations  GASTROINTESTINAL: No abdominal pain  GENITOURINARY: No hematuria  NEUROLOGICAL: No weakness  SKIN: No rash  All other review of systems is negative unless indicated above    VITAL SIGNS:   Vital Signs Last 24 Hrs  T(C): 36.1 (16 Sep 2021 09:28), Max: 36.1 (16 Sep 2021 09:28)  T(F): 97 (16 Sep 2021 09:28), Max: 97 (16 Sep 2021 09:28)  HR: 75(16 Sep 2021 09:28) (198 - 198)  BP: 131/81 (16 Sep 2021 09:28) (80/40 - 80/40)  BP(mean): --  RR: 16 (16 Sep 2021 09:28) (20 - 20)  SpO2: 100% (16 Sep 2021 09:28) (100% - 100%)  I&O's Summary    PHYSICAL EXAM:  Constitutional: NAD  Neurological: Alert and oriented  HEENT: EOMI, no JVD  Cardiovascular: S1 and S2, no murmur  Pulmonary: breath sounds bilaterally  Gastrointestinal: Bowel Sounds present, soft, nontender  Ext: no peripheral edema  Skin: No rashes, No cyanosis.  Psych:  Mood calm    LABS: All Labs Reviewed:                        16.7   10.38 )-----------( 244      ( 16 Sep 2021 09:44 )             50.2     09-16    138  |  105  |  16  ----------------------------<  190<H>  3.6   |  24  |  1.10    Ca    8.6      16 Sep 2021 09:44    TPro  7.9  /  Alb  4.0  /  TBili  0.6  /  DBili  x   /  AST  21  /  ALT  43  /  AlkPhos  66  09-16    PT/INR - ( 16 Sep 2021 09:44 )   PT: 11.9 sec;   INR: 1.02 ratio         PTT - ( 16 Sep 2021 09:44 )  PTT:30.3 sec  CARDIAC MARKERS ( 16 Sep 2021 09:44 )  <.015 ng/mL / x     / x     / x     / 1.2 ng/mL      CHIEF COMPLAINT: Patient is a 66y old  Male PMH elevated BMI, SVT on metoprolol 25 qam, seen by EP (Dr Danielle) who presents with a chief complaint of palpitations despite taking BB at home. 12 lead EKG SVT/AVNRT? 180s s/p 6 mg IV adenosine now NSR 70s. Pt had some "heart burn" with SVT, now resolved.   first trop neg    Plan  observe on tele 2more hours   get repeat trop given EKG/ST changes, which have now resolved  if no further SVT, and symptom free, and noon troponin negative;  D/C on metoprolol 25 BID  Outpt nuclear stress with Dr Nicole  Outpt f/u with Dr Danielle for SVT ablation

## 2021-09-16 NOTE — PATIENT PROFILE ADULT - NSPROPTRIGHTCAREGIVER_GEN_A_NUR
[address]  Hyperbaric Oxygen    Henrietta Henry     : 1965    DATE OF VISIT:  11/15/2017    Subjective     Henrietta Henry is a 46 y.o. male who  has a past medical history of Acute osteomyelitis of left foot ([address] (2017); Acute osteomyelitis of right foot ([address] (2016); Ascites; Blood transfusion reaction; Burst fracture of lumbar vertebra ([address] (2012); Cellulitis of left foot; Cellulitis of right lower extremity (, ); Diabetes ([address] Diabetic ulcer of right foot ([address] (early ); Diabetic ulcer of toe of left foot associated with type 2 diabetes mellitus, with necrosis of bone ([address] Fracture of tibial plateau (); MRSA (methicillin resistant staph aureus) culture positive (16, 16); Neuropathic ulcer of left foot, limited to breakdown of skin ([address] (11/3/2016); Neuropathic ulcer of toe ([address] (2014); Pleural effusion due to congestive heart failure ([address] Smoker; and Systolic CHF, acute ([address] (2013). He presents to the [facility] [place] today for hyperbaric oxygen treatment of his diabetic foot ulcer  , which is refractory to standard wound care therapy for 30 days. Patient denies fever. Patient denies nausea, vomiting or diarrhea; no ear troubles and no other new complaints; no fears or anxiety regarding treatment today. Objective       Vitals:    11/15/17 0820   BP: 133/80   Pulse: 84   Resp: 16   Temp: 96.5 °F (35.8 °C)   TempSrc: Axillary       General:  Alert, cooperative, no distress. Ears: External otic canals are within acceptable limits. TMs are well visualized. Teed Grade 0 on the right and 0 on the left pre-treatment. Teed Grade 0 on the right and 0 on the left post-treatment. Lungs:  Clear to auscultation bilaterally. Ulcer: Not examined today in HBO, if applicable.       Recent Labs      17   0813  17   1013  17   2556  11/15/17   0804  11/15/17   720
no

## 2021-09-16 NOTE — ED PROVIDER NOTE - PROGRESS NOTE DETAILS
patient 2nd ekg shows NSR 74, seen by cardiology Dr. Lopez, plan to reapeat cardiac enzymes at 12 noon, then dc home with metoprolol 25mg po bid, to f/u with Dr Danielle electrophysiology and cardio Dr Upgal spoke with Dr. Lopez regarding (+)trop, recommend admit, trend enzymes and get echo

## 2021-09-16 NOTE — H&P ADULT - ATTENDING COMMENTS
66y M PMH of HTN, HLD, and DM2 presenting with rapid heart rate, shortness of breath found to be in SVT, admitted for elevated troponin and SVT.     HPI as above. Patients symptoms are resolved. Denies headaches, nausea, vomiting, chest pain, SOB, palpitations, abdominal pain, constipation, diarrhea, melena, hematochezia, dysuria.     T(C): 36.4 (09-16-21 @ 14:35), Max: 36.4 (09-16-21 @ 14:35)  HR: 74 (09-16-21 @ 14:35) (74 - 198)  BP: 136/69 (09-16-21 @ 14:35) (80/40 - 136/69)  RR: 16 (09-16-21 @ 14:35) (16 - 20)  SpO2: 99% (09-16-21 @ 14:35) (99% - 100%)  Wt(kg): --    Physical Exam:   GENERAL: well-groomed, well-developed, NAD  HEENT: head NC/AT; EOM intact, conjunctiva & sclera clear; hearing grossly intact, moist mucous membranes  NECK: supple, no JVD  RESPIRATORY: CTA B/L, no wheezing, rales, rhonchi or rubs  CARDIOVASCULAR: S1&S2, RRR, no murmurs or gallops  ABDOMEN: soft, non-tender, non-distended, + Bowel sounds x4 quadrants, no guarding, rebound or rigidity  MUSCULOSKELETAL:  no clubbing, cyanosis or edema of all 4 extremities  LYMPH: no cervical lymphadenopathy  VASCULAR: Radial pulses 2+ bilaterally, no varicose veins   SKIN: warm and dry, color normal  NEUROLOGIC: AA&O X3, CN2-12 intact w/ no focal deficits, no sensory loss, motor Strength 5/5 in UE & LE B/L  Psych: Normal mood and affect, normal behavior    Plan:   Admit to telemetry  -trend cardiac enzymes  -obtain TTE  -Increase Toprol to 25mg BID as per cardio rec's  Outpt nuclear stress with Dr Nicole  Outpt f/u with Dr Danielle for SVT ablation  -remainder as above. 66y M PMH of HTN, HLD, and DM2 presenting with rapid heart rate, shortness of breath found to be in SVT, admitted for elevated troponin and SVT.     HPI as above. Patients symptoms are resolved. Denies headaches, nausea, vomiting, chest pain, SOB, palpitations, abdominal pain, constipation, diarrhea, melena, hematochezia, dysuria.     T(C): 36.4 (09-16-21 @ 14:35), Max: 36.4 (09-16-21 @ 14:35)  HR: 74 (09-16-21 @ 14:35) (74 - 198)  BP: 136/69 (09-16-21 @ 14:35) (80/40 - 136/69)  RR: 16 (09-16-21 @ 14:35) (16 - 20)  SpO2: 99% (09-16-21 @ 14:35) (99% - 100%)  Wt(kg): --    Physical Exam:   GENERAL: well-groomed, well-developed, NAD  HEENT: head NC/AT; EOM intact, conjunctiva & sclera clear; hearing grossly intact, moist mucous membranes  NECK: supple, no JVD  RESPIRATORY: CTA B/L, no wheezing, rales, rhonchi or rubs  CARDIOVASCULAR: S1&S2, RRR, no murmurs or gallops  ABDOMEN: soft, non-tender, non-distended, + Bowel sounds x4 quadrants, no guarding, rebound or rigidity  MUSCULOSKELETAL:  no clubbing, cyanosis or edema of all 4 extremities  LYMPH: no cervical lymphadenopathy  VASCULAR: Radial pulses 2+ bilaterally, no varicose veins   SKIN: warm and dry, color normal  NEUROLOGIC: AA&O X3, CN2-12 intact w/ no focal deficits, no sensory loss, motor Strength 5/5 in UE & LE B/L  Psych: Normal mood and affect, normal behavior    Plan:   Admit to telemetry  -trend cardiac enzymes  -obtain TTE  -Increase Toprol to 25mg BID as per cardio rec's  -ekg reviewed and with SVT and ST depressions likely from demand ischemia re[peat is NSR at rate 74 after adenosine.   Outpt nuclear stress with Dr Nicole  Outpt f/u with Dr Danielle for SVT ablation  -remainder as above.

## 2021-09-16 NOTE — ED PROVIDER NOTE - NSFOLLOWUPINSTRUCTIONS_ED_ALL_ED_FT
WHAT YOU NEED TO KNOW:    Supraventricular tachycardia (SVT) is a condition that causes your heart to beat much faster than it should. SVT is a type of abnormal heart rhythm, called an arrhythmia, that starts in the upper part of your heart. It may last a few seconds or hours to several days.    Heart Chambers         DISCHARGE INSTRUCTIONS:    Call your local emergency number (911 in the [place]) or have someone call if:   •You have any of the following signs of a heart attack: ?Squeezing, pressure, or pain in your chest      ?You may also have any of the following: ?Discomfort or pain in your back, neck, jaw, stomach, or arm      ?Shortness of breath      ?Nausea or vomiting      ?Lightheadedness or a sudden cold sweat            Return to the emergency department if:   •You are dizzy, lightheaded, or feel faint.      •You have sudden numbness or weakness in your arms or legs.      Call your doctor or cardiologist if:   •You have new or worsening symptoms.      •You have swelling in your ankles or feet.      •You have questions or concerns about your condition or care.      Medicines:   •Medicines can help control your heart rate or rhythm. You may need more than one medicine to treat your symptoms.      •Take your medicine as directed. Contact your healthcare provider if you think your medicine is not helping or if you have side effects. Tell him or her if you are allergic to any medicine. Keep a list of the medicines, vitamins, and herbs you take. Include the amounts, and when and why you take them. Bring the list or the pill bottles to follow-up visits. Carry your medicine list with you in case of an emergency.      Check your heart rate (pulse) as directed: Your healthcare provider will show you how to check your pulse, and how often to check it. Write down how fast your pulse is and if it feels regular or like it is skipping beats. Also write down the activity you were doing if your heart rate is above 100. Bring the information with you to your follow-up appointment.    How to Take a Pulse         How to manage or prevent SVT:   •Perform vagal maneuvers as directed when you have symptoms of SVT. Lie down flat and bear down like you are having a bowel movement. Do this for 10 to 30 seconds.      •Do not drink caffeine or alcohol. These can increase your risk for SVT.      •Keep a record of your symptoms. Write down what you ate or what you were doing before an episode of SVT. Also write down anything you did to make the SVT stop. Bring your record to follow up visits with your healthcare provider.      •Eat heart-healthy foods. These include fruits, vegetables, whole-grain breads, low-fat dairy products, beans, lean meats, and fish. Replace butter and margarine with heart-healthy oils such as olive oil and canola oil.             •Exercise regularly and maintain a healthy weight. Ask about the best exercise plan for you. Ask your healthcare provider what a healthy weight is for you. Ask him or her to help you create a safe weight loss plan if you are overweight.   FAMILY WALKING FOR EXERCISE           •Do not smoke. Nicotine and other chemicals in cigarettes and cigars can cause heart and lung damage. Ask your healthcare provider for information if you currently smoke and need help to quit. E-cigarettes or smokeless tobacco still contain nicotine. Talk to your healthcare provider before you use these products.      •Manage other health conditions. Take medicine as directed and follow your treatment plan. Your healthcare provider may need to change a medicine you are taking if it is causing your SVT. Do not stop taking any medicine unless directed by your provider.      Follow up with your doctor or cardiologist as directed: Write down your questions so you remember to ask them during your visits.       © Copyright [employer] 2021           back to top                          © Copyright [employer] 2021

## 2021-09-16 NOTE — H&P ADULT - PROBLEM SELECTOR PLAN 2
s/p adenosine s/p adenosine, HR now stable in 80s  prior history of SVTs, last echo and stress test normal with Dr. Loco > 1yr ago  Monitor hemodynamic closely s/p adenosine, HR now stable in 80s  prior history of SVTs, last echo and stress test normal with Dr. Loco > 1yr ago  Prior EKG in ED- SVT/AVNRT, will order f/u EKG   Monitor hemodynamic closely

## 2021-09-16 NOTE — H&P ADULT - HISTORY OF PRESENT ILLNESS
66y M PMH of HTN, HLD, and DM2 presenting with rapid heart rate, shortness of breath, started 30 minutes prior to arrival, states he took his metoprolol when the symptoms started. ***Charting in progress***    ED course  Vitals: T97, , 80/40, RR 20 SpO2 100% on RA  Significant labs: Troponin 0.247  Patient received: Adenosine 6mg IVP, ASA 325mg   CXR: wet read with no acute findings 66y M PMH of HTN, HLD, prior SVT episode and DM2 presenting with rapid heart rate, shortness of breath, started 30 minutes prior to arrival, states he took his metoprolol when the symptoms started. Patient states he was at home when the episode of SVT began and he was aware of the SVT episode because of prior episodes of SVT. Patient states the episode lasted for 45 minutes in total until he received adenosine. States he endorses slight midsternal chest pain and heart burn 30 mins into the episode. States the chest pain was non-radiating and lasted for a few mins. He did not take anything at home for his episode of SVT besides the metoprolol. Patient endorses dizziness and SOB during the episode and denies any loss of consciousness. States his last episode was one year ago and he also had elevated Troponins at that time and decided not to be admitted and followed up with outpatient cardiology. States he saw his cardiologist over 1 year ago and last echo and stress test was normal.     ED course  Vitals: T97, , 80/40, RR 20 SpO2 100% on RA  Significant labs: Troponin 0.247  Patient received: Adenosine 6mg IVP, ASA 325mg   EKG: SVT/AVNRT  CXR: wet read with no acute findings 66y M PMH of HTN, HLD, prior SVT episode and DM2 presenting with rapid heart rate, shortness of breath, started 30 minutes prior to arrival, states he took his metoprolol when the symptoms started. Patient states he was at home when the episode of SVT began and he was aware of the SVT episode because of prior episodes of SVT. Patient states the episode lasted for 45 minutes in total until he received adenosine. States he endorses slight midsternal chest pain and heart burn 30 mins into the episode. States the chest pain was non-radiating and lasted for a few mins. He did not take anything at home for his episode of SVT besides the metoprolol. Patient endorses dizziness and SOB during the episode and denies any loss of consciousness. States his last episode was one year ago and he also had elevated Troponins at that time and decided not to be admitted and followed up with outpatient cardiology. States he saw his cardiologist over 1 year ago and last echo and stress test was normal.   During my examination patient felt well and was free of chest pain and denies palpitations.     ED course  Vitals: T97, , 80/40, RR 20 SpO2 100% on RA  Significant labs: Troponin 0.247  Patient received: Adenosine 6mg IVP, ASA 325mg   EKG: SVT/AVNRT  CXR: wet read with no acute findings

## 2021-09-16 NOTE — H&P ADULT - PROBLEM SELECTOR PLAN 4
Continue home medications of ___  Monitor routine hemodynamics Chronic, stable   Continue home medications of Metoprolol Succinate 25mg daily and Ramipril 5mg  Monitor routine hemodynamics Chronic, stable   Continue home medications of Metoprolol Succinate 25mg daily and continue Lisinopril 20mg for therapeutic interchange for Ramipril 5mg w/ hold parameters  Monitor routine hemodynamics Chronic, stable   Continue home medications of Metoprolol Succinate 25mg BID and continue Lisinopril 20mg for therapeutic interchange for Ramipril 5mg w/ hold parameters  Monitor routine hemodynamics

## 2021-09-16 NOTE — H&P ADULT - NSHPPHYSICALEXAM_GEN_ALL_CORE
T(C): 36.1 (09-16-21 @ 09:28), Max: 36.1 (09-16-21 @ 09:28)  HR: 198 (09-16-21 @ 09:28) (198 - 198)  BP: 80/40 (09-16-21 @ 09:28) (80/40 - 80/40)  RR: 20 (09-16-21 @ 09:28) (20 - 20)  SpO2: 100% (09-16-21 @ 09:28) (100% - 100%)    Physical Exam:  Gen: well appearing, NAD  HEENT: NCAT, PEERLA b/l, EOMI b/l, no conjunctival erythema  Cardio: regular rate and rhythm, +s1s2, no murmurs, rubs, or gallops  Pulm: CTA b/l, no wheezes, rales or rhonchi  Abdomen: soft, nontender, nondistended, +BS x4 quadrants, no guarding  Extremities: no clubbing, cyanosis or edema, +2 pedal pulses  Neuro: AAOx3, CNII-XII intact grossly, 5/5 strength all extremities, sensation intact  Skin: warm and dry

## 2021-09-16 NOTE — H&P ADULT - ASSESSMENT
66y M PMH of HTN, HLD, and DM2 presenting with rapid heart rate, shortness of breath found to be in SVT, admitted for elevated troponin and SVT. **Charting in progress*** 66y M PMH of HTN, HLD, and DM2 presenting with rapid heart rate, shortness of breath found to be in SVT, admitted for elevated troponin and SVT.

## 2021-09-16 NOTE — H&P ADULT - NSHPSOCIALHISTORY_GEN_ALL_CORE
Denies tobacco smoking, states drinks very little alcohol (times in a few months), no reccreational drug use  States he lives alone, able to complete ADLs  Works at a [employer] called [employer] Denies tobacco smoking, states drinks very little alcohol (times in a few months), no reccreational drug use  States he lives alone, able to complete ADLs  Works at a [employer] company called [employer]  No family hx of SVT in parents

## 2021-09-16 NOTE — ED PROVIDER NOTE - OBJECTIVE STATEMENT
66 male presents to ER c/o rapid heart rate, shortness of breath, started 30 minutes prior to arrival, states he took his metoprolol when the symptoms started.

## 2021-09-16 NOTE — H&P ADULT - PROBLEM SELECTOR PLAN 1
In the setting of SVT  Initial troponin negative, second troponin .247  Cardio, Dr. Lopez consulted by the ED, recs appreciated In the setting of SVT  Initial troponin negative, second troponin .247  will trend cardiac enzymes   Cardio, Dr. Lopez consulted by the ED, recs appreciated In the setting of SVT  Initial troponin negative, second troponin .247  will trend troponin x2  TTE scheduled for tomorrow, f/u results   Cardio, Dr. Lopez consulted by the ED, recs appreciated In the setting of SVT  Initial troponin negative, second troponin .247  will trend troponin x2  TTE ordered, f/u results   Cardio, Dr. Lopez consulted by the ED, recs appreciated

## 2021-09-16 NOTE — ED PROVIDER NOTE - CARE PROVIDER_API CALL
David Danielle (MD)  Cardiac Electrophysiology; Cardiology; Cardiovascular Disease; Internal Medicine  [address] [place] [address]  Phone: [phone]  Fax: [phone]  Follow Up Time:     Jean Carlos Nicole)  Cardiovascular Disease  [address] [facility], [address] [place] [address]  Phone: [phone]  Fax: [phone]  Follow Up Time:

## 2021-09-17 ENCOUNTER — TRANSCRIPTION ENCOUNTER (OUTPATIENT)
Age: 66
End: 2021-09-17

## 2021-09-17 VITALS
TEMPERATURE: 98 F | SYSTOLIC BLOOD PRESSURE: 124 MMHG | HEART RATE: 57 BPM | DIASTOLIC BLOOD PRESSURE: 72 MMHG | OXYGEN SATURATION: 97 % | RESPIRATION RATE: 16 BRPM

## 2021-09-17 LAB
A1C WITH ESTIMATED AVERAGE GLUCOSE RESULT: 6.9 % — HIGH (ref 4–5.6)
ALBUMIN SERPL ELPH-MCNC: 3.8 G/DL — SIGNIFICANT CHANGE UP (ref 3.3–5)
ALP SERPL-CCNC: 58 U/L — SIGNIFICANT CHANGE UP (ref 40–120)
ALT FLD-CCNC: 38 U/L — SIGNIFICANT CHANGE UP (ref 12–78)
ANION GAP SERPL CALC-SCNC: 5 MMOL/L — SIGNIFICANT CHANGE UP (ref 5–17)
AST SERPL-CCNC: 23 U/L — SIGNIFICANT CHANGE UP (ref 15–37)
BASOPHILS # BLD AUTO: 0.05 K/UL — SIGNIFICANT CHANGE UP (ref 0–0.2)
BASOPHILS NFR BLD AUTO: 0.6 % — SIGNIFICANT CHANGE UP (ref 0–2)
BILIRUB SERPL-MCNC: 1 MG/DL — SIGNIFICANT CHANGE UP (ref 0.2–1.2)
BUN SERPL-MCNC: 14 MG/DL — SIGNIFICANT CHANGE UP (ref 7–23)
CALCIUM SERPL-MCNC: 8.6 MG/DL — SIGNIFICANT CHANGE UP (ref 8.5–10.1)
CHLORIDE SERPL-SCNC: 108 MMOL/L — SIGNIFICANT CHANGE UP (ref 96–108)
CO2 SERPL-SCNC: 29 MMOL/L — SIGNIFICANT CHANGE UP (ref 22–31)
COVID-19 SPIKE DOMAIN AB INTERP: POSITIVE
COVID-19 SPIKE DOMAIN ANTIBODY RESULT: >250 U/ML — HIGH
CREAT SERPL-MCNC: 0.94 MG/DL — SIGNIFICANT CHANGE UP (ref 0.5–1.3)
EOSINOPHIL # BLD AUTO: 0.36 K/UL — SIGNIFICANT CHANGE UP (ref 0–0.5)
EOSINOPHIL NFR BLD AUTO: 4 % — SIGNIFICANT CHANGE UP (ref 0–6)
ESTIMATED AVERAGE GLUCOSE: 151 MG/DL — HIGH (ref 68–114)
GLUCOSE SERPL-MCNC: 106 MG/DL — HIGH (ref 70–99)
HCT VFR BLD CALC: 46.8 % — SIGNIFICANT CHANGE UP (ref 39–50)
HCV AB S/CO SERPL IA: 0.09 S/CO — SIGNIFICANT CHANGE UP (ref 0–0.99)
HCV AB SERPL-IMP: SIGNIFICANT CHANGE UP
HGB BLD-MCNC: 15.5 G/DL — SIGNIFICANT CHANGE UP (ref 13–17)
IMM GRANULOCYTES NFR BLD AUTO: 0.6 % — SIGNIFICANT CHANGE UP (ref 0–1.5)
LYMPHOCYTES # BLD AUTO: 1.63 K/UL — SIGNIFICANT CHANGE UP (ref 1–3.3)
LYMPHOCYTES # BLD AUTO: 18 % — SIGNIFICANT CHANGE UP (ref 13–44)
MCHC RBC-ENTMCNC: 30.5 PG — SIGNIFICANT CHANGE UP (ref 27–34)
MCHC RBC-ENTMCNC: 33.1 GM/DL — SIGNIFICANT CHANGE UP (ref 32–36)
MCV RBC AUTO: 91.9 FL — SIGNIFICANT CHANGE UP (ref 80–100)
MONOCYTES # BLD AUTO: 0.83 K/UL — SIGNIFICANT CHANGE UP (ref 0–0.9)
MONOCYTES NFR BLD AUTO: 9.2 % — SIGNIFICANT CHANGE UP (ref 2–14)
NEUTROPHILS # BLD AUTO: 6.12 K/UL — SIGNIFICANT CHANGE UP (ref 1.8–7.4)
NEUTROPHILS NFR BLD AUTO: 67.6 % — SIGNIFICANT CHANGE UP (ref 43–77)
NRBC # BLD: 0 /100 WBCS — SIGNIFICANT CHANGE UP (ref 0–0)
PLATELET # BLD AUTO: 206 K/UL — SIGNIFICANT CHANGE UP (ref 150–400)
POTASSIUM SERPL-MCNC: 4.1 MMOL/L — SIGNIFICANT CHANGE UP (ref 3.5–5.3)
POTASSIUM SERPL-SCNC: 4.1 MMOL/L — SIGNIFICANT CHANGE UP (ref 3.5–5.3)
PROT SERPL-MCNC: 7.4 G/DL — SIGNIFICANT CHANGE UP (ref 6–8.3)
RBC # BLD: 5.09 M/UL — SIGNIFICANT CHANGE UP (ref 4.2–5.8)
RBC # FLD: 12.4 % — SIGNIFICANT CHANGE UP (ref 10.3–14.5)
SARS-COV-2 IGG+IGM SERPL QL IA: >250 U/ML — HIGH
SARS-COV-2 IGG+IGM SERPL QL IA: POSITIVE
SODIUM SERPL-SCNC: 142 MMOL/L — SIGNIFICANT CHANGE UP (ref 135–145)
TROPONIN I SERPL-MCNC: 0.33 NG/ML — HIGH (ref 0.01–0.04)
WBC # BLD: 9.04 K/UL — SIGNIFICANT CHANGE UP (ref 3.8–10.5)
WBC # FLD AUTO: 9.04 K/UL — SIGNIFICANT CHANGE UP (ref 3.8–10.5)

## 2021-09-17 PROCEDURE — 82553 CREATINE MB FRACTION: CPT

## 2021-09-17 PROCEDURE — 86769 SARS-COV-2 COVID-19 ANTIBODY: CPT

## 2021-09-17 PROCEDURE — 86803 HEPATITIS C AB TEST: CPT

## 2021-09-17 PROCEDURE — 99285 EMERGENCY DEPT VISIT HI MDM: CPT

## 2021-09-17 PROCEDURE — 36415 COLL VENOUS BLD VENIPUNCTURE: CPT

## 2021-09-17 PROCEDURE — 99239 HOSP IP/OBS DSCHRG MGMT >30: CPT

## 2021-09-17 PROCEDURE — 96374 THER/PROPH/DIAG INJ IV PUSH: CPT

## 2021-09-17 PROCEDURE — 84443 ASSAY THYROID STIM HORMONE: CPT

## 2021-09-17 PROCEDURE — U0005: CPT

## 2021-09-17 PROCEDURE — 85025 COMPLETE CBC W/AUTO DIFF WBC: CPT

## 2021-09-17 PROCEDURE — 85610 PROTHROMBIN TIME: CPT

## 2021-09-17 PROCEDURE — 93005 ELECTROCARDIOGRAM TRACING: CPT

## 2021-09-17 PROCEDURE — 85730 THROMBOPLASTIN TIME PARTIAL: CPT

## 2021-09-17 PROCEDURE — 71045 X-RAY EXAM CHEST 1 VIEW: CPT

## 2021-09-17 PROCEDURE — 83036 HEMOGLOBIN GLYCOSYLATED A1C: CPT

## 2021-09-17 PROCEDURE — 84484 ASSAY OF TROPONIN QUANT: CPT

## 2021-09-17 PROCEDURE — 80053 COMPREHEN METABOLIC PANEL: CPT

## 2021-09-17 PROCEDURE — 93306 TTE W/DOPPLER COMPLETE: CPT

## 2021-09-17 PROCEDURE — 83880 ASSAY OF NATRIURETIC PEPTIDE: CPT

## 2021-09-17 PROCEDURE — U0003: CPT

## 2021-09-17 PROCEDURE — 82550 ASSAY OF CK (CPK): CPT

## 2021-09-17 RX ORDER — METHOCARBAMOL 500 MG/1
1 TABLET, FILM COATED ORAL
Qty: 0 | Refills: 0 | DISCHARGE

## 2021-09-17 RX ORDER — METOPROLOL TARTRATE 50 MG
25 TABLET ORAL
Refills: 0 | Status: DISCONTINUED | OUTPATIENT
Start: 2021-09-17 | End: 2021-09-17

## 2021-09-17 RX ORDER — MELOXICAM 15 MG/1
1 TABLET ORAL
Qty: 0 | Refills: 0 | DISCHARGE

## 2021-09-17 RX ADMIN — LISINOPRIL 20 MILLIGRAM(S): 2.5 TABLET ORAL at 06:02

## 2021-09-17 NOTE — DISCHARGE NOTE PROVIDER - PROVIDER TOKENS
PROVIDER:[TOKEN:[[id number]],FOLLOWUP:[1 week]],PROVIDER:[TOKEN:[[id number]],FOLLOWUP:[1-3 days],ESTABLISHEDPATIENT:[T]],PROVIDER:[TOKEN:[[id number]],FOLLOWUP:[2 weeks],ESTABLISHEDPATIENT:[T]]

## 2021-09-17 NOTE — DISCHARGE NOTE PROVIDER - NSDCCPGOAL_GEN_ALL_CORE_FT
To get better and follow your care plan as instructed.  If you develop any concerning symptoms such as chest pain, palpitations, light-headedness, dizziness, SOB please call 911 or return to the nearest emergency room.

## 2021-09-17 NOTE — DISCHARGE NOTE PROVIDER - NSDCMRMEDTOKEN_GEN_ALL_CORE_FT
atorvastatin: 40  orally once a day (at bedtime)  ramipril: 5 milligram(s) orally once a day  tadalafil 10 mg oral tablet: 0.5 tab(s) orally once a day, As Needed  Toprol-XL 25 mg oral tablet, extended release: 1 tab(s) orally once a day

## 2021-09-17 NOTE — DISCHARGE NOTE PROVIDER - NSDCCPCAREPLAN_GEN_ALL_CORE_FT
PRINCIPAL DISCHARGE DIAGNOSIS  Diagnosis: SVT (supraventricular tachycardia)  Assessment and Plan of Treatment: You had an abnormal heart rhythm on admission.   Please continue your Metoprolol Succinate 25mg once daily. Please follow up with your cardiologist within 3 days of discharge. Please discuss the need for a nuclear stress test and an appointment with an electrophysiolgist [heart doctor that deals with heart rhythm].

## 2021-09-17 NOTE — PROGRESS NOTE ADULT - SUBJECTIVE AND OBJECTIVE BOX
[facility]    CHIEF COMPLAINT: Patient is a 66y old  Male who presents with a chief complaint of SVT with elevated troponin (16 Sep 2021 13:39)      Follow Up: [ ] Chest Pain      [ ] Dyspnea     [ ] Palpitations    [ ] Atrial Fibrillation     [ ] Ventricular Dysrhythmia    [ ] Abnormal EKG                      [ ] Abnormal Cardiac Enzymes     [ ] Valvular Disease    HPI:  66y M PMH of HTN, HLD, prior SVT episode and DM2 presenting with rapid heart rate, shortness of breath, started 30 minutes prior to arrival, states he took his metoprolol when the symptoms started. Patient states he was at home when the episode of SVT began and he was aware of the SVT episode because of prior episodes of SVT. Patient states the episode lasted for 45 minutes in total until he received adenosine. States he endorses slight midsternal chest pain and heart burn 30 mins into the episode. States the chest pain was non-radiating and lasted for a few mins. He did not take anything at home for his episode of SVT besides the metoprolol. Patient endorses dizziness and SOB during the episode and denies any loss of consciousness. States his last episode was one year ago and he also had elevated Troponins at that time and decided not to be admitted and followed up with outpatient cardiology. States he saw his cardiologist over 1 year ago and last echo and stress test was normal.   During my examination patient felt well and was free of chest pain and denies palpitations.     ED course  Vitals: T97, , 80/40, RR 20 SpO2 100% on RA  Significant labs: Troponin 0.247  Patient received: Adenosine 6mg IVP, ASA 325mg   EKG: SVT/AVNRT  CXR: wet read with no acute findings (16 Sep 2021 13:39)    PAST MEDICAL & SURGICAL HISTORY:  Hyperlipidemia    HTN (hypertension)    Diabetes mellitus    S/P ear surgery    H/O right inguinal hernia repair      MEDICATIONS  (STANDING):  atorvastatin 40 milliGRAM(s) Oral at bedtime  enoxaparin Injectable 40 milliGRAM(s) SubCutaneous daily  influenza   Vaccine 0.5 milliLiter(s) IntraMuscular once  lisinopril 20 milliGRAM(s) Oral daily  metoprolol succinate ER 25 milliGRAM(s) Oral two times a day    MEDICATIONS  (PRN):    Allergies    No Known Allergies    Intolerances        REVIEW OF SYSTEMS:    CONSTITUTIONAL: No weakness, fevers or chills.   EYES/ENT: No visual changes;    NECK: No pain or stiffness  RESPIRATORY: No cough, wheezing, No shortness of breath  CARDIOVASCULAR: No chest pain or palpitations  GASTROINTESTINAL: No abdominal pain, or hematochezia.  GENITOURINARY: No dysuria orhematuria  NEUROLOGICAL: No numbness or weakness  SKIN: No itching, burning, rashes  All other review of systems is negative unless indicated above    Vital Signs Last 24 Hrs  T(C): 36.8 (17 Sep 2021 07:30), Max: 36.8 (17 Sep 2021 07:30)  T(F): 98.2 (17 Sep 2021 07:30), Max: 98.2 (17 Sep 2021 07:30)  HR: 61 (17 Sep 2021 07:30) (56 - 198)  BP: 117/61 (17 Sep 2021 07:30) (80/40 - 136/69)  BP(mean): --  RR: 16 (17 Sep 2021 07:30) (16 - 20)  SpO2: 97% (17 Sep 2021 07:30) (97% - 100%)  I&O's Summary      PHYSICAL EXAM:  Constitutional: NAD  Neurological: Alert, no focal deficits  HEENT: no JVD, EOMI  Cardiovascular: Regular, S1 and S2, no murmur  Pulmonary: breath sounds bilaterally  Gastrointestinal: Bowel Sounds present, soft, nontender  EXT:  no peripheral edema  Skin: No rashes.  Psych:  Mood calm  LABS: All Labs Reviewed:                          15.5   9.04  )-----------( 206      ( 17 Sep 2021 05:20 )             46.8     09-17    142  |  108  |  14  ----------------------------<  106<H>  4.1   |  29  |  0.94    Ca    8.6      17 Sep 2021 05:20    TPro  7.4  /  Alb  3.8  /  TBili  1.0  /  DBili  x   /  AST  23  /  ALT  38  /  AlkPhos  58  09-17    PT/INR - ( 16 Sep 2021 09:44 )   PT: 11.9 sec;   INR: 1.02 ratio         PTT - ( 16 Sep 2021 09:44 )  PTT:30.3 sec  CARDIAC MARKERS ( 17 Sep 2021 00:20 )  .328 ng/mL / x     / x     / x     / x      CARDIAC MARKERS ( 16 Sep 2021 18:21 )  .420 ng/mL / x     / x     / x     / x      CARDIAC MARKERS ( 16 Sep 2021 12:34 )  .247 ng/mL / x     / 117 U/L / x     / 3.1 ng/mL  CARDIAC MARKERS ( 16 Sep 2021 09:44 )  <.015 ng/mL / x     / x     / x     / 1.2 ng/mL      TTE Echo Complete w/o Contrast w/ Doppler:    EXAM:  ECHO TTE WO CON COMP W DOPP         PROCEDURE DATE:  09/16/2021        INTERPRETATION:  Ordering Physician: KERA AYALA [id number]    Indication: SVT, abnormal EKG    Study Quality: Good  A complete echocardiographic study was performed utilizing standard protocol including spectral and color Doppler in all echocardiographic windows.    Height: 167 cm  Weight:  BSA:  Blood Pressure: 136/69    MEASUREMENTS  IVS: 1.1cm  PWT: 0.9cm  LA: 3.3cm  AO: 3.1cm  AV Cusp Separation: cm  LVIDd: 5.1cm  LVIDs: 3.5cm  LVOT: 2cm    LVEF: 55-60%  RVSP: 60mmHg    FINDINGS  Left Ventricle: Normal LV systolic function  Aortic Valve: Trace AI  Mitral Valve: Trace MR  Tricuspid Valve: Trace TR  Pulmonic Valve: Trace PI  Left Atrium: Normal  Right Ventricle: Normal  Right Atrium: Normal  Diastolic Function: Grade 1 diastolic dysfunction  Pericardium/Pleura: Normal pericardium      Impression:  Normal LV systolic function, no significant valve disease    --- End of Report ---              CALLIE VEE MD; Attending Cardiologist  This document has been electronically signed. Sep 17 2021  8:41AM (09-16-21 @ 21:55)  Xray Chest 1 View- PORTABLE-Urgent:   EXAM:  XR CHEST PORTABLE URGENT 1V                            PROCEDURE DATE:  09/16/2021          INTERPRETATION:  Palpitations.    AP chest.    Heart and mediastinum normal.  Lungs clear.  Costophrenic angles sharp bilaterally.    IMPRESSION: Normal chest    --- End of Report ---            LUCIA EBE MD; Attending Radiologist  This document has been electronically signed. Sep 16 2021  2:46PM (09-16-21 @ 09:50)  12 Lead ECG:   Ventricular Rate 74 BPM    Atrial Rate 74 BPM    P-R Interval 168 ms    QRS Duration 90 ms    Q-T Interval 404 ms    QTC Calculation(Bazett) 448 ms    P Axis 56 degrees    R Axis -17 degrees    T Axis 4 degrees    Diagnosis Line Normal sinus rhythm  Possible Left atrial enlargement  Nonspecific ST abnormality  Abnormal ECG  Confirmed by JUANJO LUNA ([id number]) on 9/16/2021 8:20:45 PM (09-16-21 @ 09:39)  12 Lead ECG:   Ventricular Rate 187 BPM    Atrial Rate 197 BPM    QRS Duration 146 ms    Q-T Interval 264 ms    QTC Calculation(Bazett) 465 ms    R Axis -18 degrees    T Axis 124 degrees    Diagnosis Line *** Poor data quality, interpretation may be adversely affected  psvt  Confirmed by JUANJO LUNA ([id number]) on 9/16/2021 8:20:29 PM (09-16-21 @ 09:30)    CHIEF COMPLAINT: Patient is a 66y old  Male PMH elevated BMI, SVT on metoprolol 25 qam, seen by EP (Dr Danielle) who presents with a chief complaint of palpitations despite taking BB at home. 12 lead EKG SVT/AVNRT? 180s s/p 6 mg IV adenosine now NSR 70s. Pt had some "heart burn" with SVT, now resolved.   first trop neg    Plan     trop likely positive from demand ischemia  EKG/ST changes, resolved  no further SVT, and symptom free,   D/C on metoprolol 25 QD not BID (to avoid bradycardia)    Outpt nuclear stress with Dr Nicole  Outpt f/u with Dr Danielle for SVT ablation

## 2021-09-17 NOTE — DISCHARGE NOTE PROVIDER - CARE PROVIDER_API CALL
David Danielle (MD)  Cardiac Electrophysiology; Cardiology; Cardiovascular Disease; Internal Medicine  [address] [place] [address]  Phone: [phone]  Fax: [phone]  Follow Up Time: 1 week    Jean Carlos Nicole)  Cardiovascular Disease  [facility] [place] [address]  Phone: [phone]  Fax: [phone]  Established Patient  Follow Up Time: 1-3 days    Eitan Leon)  Internal Medicine  [address] [place] [address]  Phone: [phone]  Fax: [phone]  Established Patient  Follow Up Time: 2 weeks

## 2021-09-17 NOTE — DISCHARGE NOTE NURSING/CASE MANAGEMENT/SOCIAL WORK - NSDCVIVACCINE_GEN_ALL_CORE_FT
Tdap; 25-Feb-2015 02:36; Sara Riddle (RN); [employer]; [id number]; IntraMuscular; Deltoid Right.; 0.5 milliLiter(s); VIS (VIS Published: 09-May-2013, VIS Presented: 25-Feb-2015);

## 2021-09-17 NOTE — DISCHARGE NOTE NURSING/CASE MANAGEMENT/SOCIAL WORK - PATIENT PORTAL LINK FT
You can access the FollowMyHealth Patient Portal offered by [facility] by registering at the following website: [email or web address]. By joining [employer]’s FollowMyHealth portal, you will also be able to view your health information using other applications (apps) compatible with our system.

## 2021-09-17 NOTE — DISCHARGE NOTE PROVIDER - HOSPITAL COURSE
HPI:  66y M PMH of HTN, HLD, prior SVT episode and DM2 presenting with rapid heart rate, shortness of breath, started 30 minutes prior to arrival, states he took his metoprolol when the symptoms started. Patient states he was at home when the episode of SVT began and he was aware of the SVT episode because of prior episodes of SVT. Patient states the episode lasted for 45 minutes in total until he received adenosine. States he endorses slight midsternal chest pain and heart burn 30 mins into the episode. States the chest pain was non-radiating and lasted for a few mins. He did not take anything at home for his episode of SVT besides the metoprolol. Patient endorses dizziness and SOB during the episode and denies any loss of consciousness. States his last episode was one year ago and he also had elevated Troponins at that time and decided not to be admitted and followed up with outpatient cardiology. States he saw his cardiologist over 1 year ago and last echo and stress test was normal.   During my examination patient felt well and was free of chest pain and denies palpitations.     ED course  Vitals: T97, , 80/40, RR 20 SpO2 100% on RA  Significant labs: Troponin 0.247  Patient received: Adenosine 6mg IVP, ASA 325mg   EKG: SVT/AVNRT        ---  HOSPITAL COURSE:   Patient was noted to be in SVT. He received 6mg adenosine IVP x1 and converted to NSR. His palpitations resolved and he did not have any chest pain or palpitations thereafter. He was noted to have an elevated troponin likely due to demand ischemia in setting of SVT. His troponin downtrended. He had an echocardiogram performed on admission.   On day of discharge he had no complaints. Denies fevers, chills, headaches, nausea, vomiting, chest pain, SOB, palpitations, abdominal pain, constipation, diarrhea, melena, hematochezia, dysuria.     Patient was instructed to follow up with his cardiologist Dr Nicole within 3 days of hospital discharge. He was given return precautions.     Date of discharge physical exam:  T(C): 36.8 (09-17-21 @ 07:30), Max: 36.8 (09-17-21 @ 07:30)  HR: 61 (09-17-21 @ 07:30) (56 - 198)  BP: 117/61 (09-17-21 @ 07:30) (80/40 - 136/69)  RR: 16 (09-17-21 @ 07:30) (16 - 20)  SpO2: 97% (09-17-21 @ 07:30) (97% - 100%)  Wt(kg): --    Physical Exam:   GENERAL: well-groomed, well-developed, NAD  HEENT: head NC/AT; conjunctiva & sclera clear; hearing grossly intact, moist mucous membranes  NECK: supple, no JVD  RESPIRATORY: CTA B/L, no wheezing, rales, rhonchi or rubs  CARDIOVASCULAR: S1&S2, RRR, no murmurs or gallops  ABDOMEN: soft, non-tender, non-distended, + Bowel sounds x4 quadrants, no guarding, rebound or rigidity  MUSCULOSKELETAL:  no clubbing, cyanosis or edema of all 4 extremities  LYMPH: no cervical lymphadenopathy  VASCULAR: Radial pulses 2+ bilaterally, no varicose veins   SKIN: warm and dry, color normal  NEUROLOGIC: AA&O X3, CN2-12 intact w/ no focal deficits, no sensory loss, motor Strength 5/5 in UE & LE B/L  Psych: Normal mood and affect, normal behavior        ---  CONSULTANTS:   Cardiology: Dr Lopez    ---  TIME SPENT:  I, the attending physician, was physically present for the key portions of the evaluation and management (E/M) service provided. The total amount of time spent reviewing the hospital notes, laboratory values, imaging findings, assessing/counseling the patient, discussing with consultant physicians, social work, nursing staff was 36 minutes    ---

## 2022-06-13 NOTE — PATIENT PROFILE ADULT - NSTRANSFERHEARINGAID_GEN_A_NUR
Detail Level: Simple Introduction Text (Please End With A Colon): The following procedure deferred: Procedure To Be Performed At Next Visit: Cryotherapy left ear/left

## 2022-06-24 ENCOUNTER — OUTPATIENT (OUTPATIENT)
Dept: OUTPATIENT SERVICES | Facility: HOSPITAL | Age: 67
LOS: 1 days | End: 2022-06-24
Payer: COMMERCIAL

## 2022-06-24 VITALS
HEART RATE: 60 BPM | TEMPERATURE: 97 F | DIASTOLIC BLOOD PRESSURE: 70 MMHG | HEIGHT: 66 IN | RESPIRATION RATE: 16 BRPM | SYSTOLIC BLOOD PRESSURE: 124 MMHG | WEIGHT: 162.04 LBS | OXYGEN SATURATION: 98 %

## 2022-06-24 DIAGNOSIS — Z01.818 ENCOUNTER FOR OTHER PREPROCEDURAL EXAMINATION: ICD-10-CM

## 2022-06-24 DIAGNOSIS — H69.91 UNSPECIFIED EUSTACHIAN TUBE DISORDER, RIGHT EAR: ICD-10-CM

## 2022-06-24 DIAGNOSIS — Z98.890 OTHER SPECIFIED POSTPROCEDURAL STATES: Chronic | ICD-10-CM

## 2022-06-24 LAB
A1C WITH ESTIMATED AVERAGE GLUCOSE RESULT: 6.4 % — HIGH (ref 4–5.6)
ANION GAP SERPL CALC-SCNC: 4 MMOL/L — LOW (ref 5–17)
APTT BLD: 31.4 SEC — SIGNIFICANT CHANGE UP (ref 27.5–35.5)
BUN SERPL-MCNC: 11 MG/DL — SIGNIFICANT CHANGE UP (ref 7–23)
CALCIUM SERPL-MCNC: 9.6 MG/DL — SIGNIFICANT CHANGE UP (ref 8.5–10.1)
CHLORIDE SERPL-SCNC: 106 MMOL/L — SIGNIFICANT CHANGE UP (ref 96–108)
CO2 SERPL-SCNC: 32 MMOL/L — HIGH (ref 22–31)
CREAT SERPL-MCNC: 0.97 MG/DL — SIGNIFICANT CHANGE UP (ref 0.5–1.3)
EGFR: 86 ML/MIN/1.73M2 — SIGNIFICANT CHANGE UP
ESTIMATED AVERAGE GLUCOSE: 137 MG/DL — HIGH (ref 68–114)
GLUCOSE SERPL-MCNC: 140 MG/DL — HIGH (ref 70–99)
HCT VFR BLD CALC: 48.1 % — SIGNIFICANT CHANGE UP (ref 39–50)
HGB BLD-MCNC: 15.6 G/DL — SIGNIFICANT CHANGE UP (ref 13–17)
INR BLD: 1.06 RATIO — SIGNIFICANT CHANGE UP (ref 0.88–1.16)
MCHC RBC-ENTMCNC: 30.6 PG — SIGNIFICANT CHANGE UP (ref 27–34)
MCHC RBC-ENTMCNC: 32.4 GM/DL — SIGNIFICANT CHANGE UP (ref 32–36)
MCV RBC AUTO: 94.3 FL — SIGNIFICANT CHANGE UP (ref 80–100)
NRBC # BLD: 0 /100 WBCS — SIGNIFICANT CHANGE UP (ref 0–0)
PLATELET # BLD AUTO: 215 K/UL — SIGNIFICANT CHANGE UP (ref 150–400)
POTASSIUM SERPL-MCNC: 4.4 MMOL/L — SIGNIFICANT CHANGE UP (ref 3.5–5.3)
POTASSIUM SERPL-SCNC: 4.4 MMOL/L — SIGNIFICANT CHANGE UP (ref 3.5–5.3)
PROTHROM AB SERPL-ACNC: 12.4 SEC — SIGNIFICANT CHANGE UP (ref 10.5–13.4)
RBC # BLD: 5.1 M/UL — SIGNIFICANT CHANGE UP (ref 4.2–5.8)
RBC # FLD: 12.1 % — SIGNIFICANT CHANGE UP (ref 10.3–14.5)
SODIUM SERPL-SCNC: 142 MMOL/L — SIGNIFICANT CHANGE UP (ref 135–145)
WBC # BLD: 7.07 K/UL — SIGNIFICANT CHANGE UP (ref 3.8–10.5)
WBC # FLD AUTO: 7.07 K/UL — SIGNIFICANT CHANGE UP (ref 3.8–10.5)

## 2022-06-24 PROCEDURE — 85730 THROMBOPLASTIN TIME PARTIAL: CPT

## 2022-06-24 PROCEDURE — 80048 BASIC METABOLIC PNL TOTAL CA: CPT

## 2022-06-24 PROCEDURE — 93010 ELECTROCARDIOGRAM REPORT: CPT

## 2022-06-24 PROCEDURE — 85027 COMPLETE CBC AUTOMATED: CPT

## 2022-06-24 PROCEDURE — G0463: CPT

## 2022-06-24 PROCEDURE — 36415 COLL VENOUS BLD VENIPUNCTURE: CPT

## 2022-06-24 PROCEDURE — 93005 ELECTROCARDIOGRAM TRACING: CPT

## 2022-06-24 PROCEDURE — 83036 HEMOGLOBIN GLYCOSYLATED A1C: CPT

## 2022-06-24 PROCEDURE — 85610 PROTHROMBIN TIME: CPT

## 2022-06-24 RX ORDER — TADALAFIL 10 MG/1
0.5 TABLET, FILM COATED ORAL
Qty: 0 | Refills: 0 | DISCHARGE

## 2022-06-24 NOTE — H&P PST ADULT - PROBLEM SELECTOR PLAN 1
In the setting of SVT  Initial troponin negative, second troponin .247  will trend troponin x2  TTE ordered, f/u results   Cardio, Dr. Lopez consulted by the ED, recs appreciated Scheduled for Tympanostomy and T-tube right ear on 7/6/22. Pre op testing today.  Pre op instructions:    Medical and cardiac eval advised   Vaccinated for Covid  Hold OTC supplements.   May take Tylenol for pain or headache if needed.    NPO after 11pm to the morning of surgery.   Covid testing advised 3 days prior to procedure, information given.  Patient verbalized understanding.

## 2022-06-24 NOTE — H&P PST ADULT - NSICDXFAMILYHX_GEN_ALL_CORE_FT
FAMILY HISTORY:  Father  Still living? No  FH: CAD (coronary artery disease), Age at diagnosis: Age Unknown    Mother  Still living? No  FH: Alzheimer's disease, Age at diagnosis: Age Unknown  FH: type 2 diabetes, Age at diagnosis: Age Unknown

## 2022-06-24 NOTE — H&P PST ADULT - PROBLEM SELECTOR PLAN 2
s/p adenosine, HR now stable in 80s  prior history of SVTs, last echo and stress test normal with Dr. Loco > 1yr ago  Prior EKG in ED- SVT/AVNRT, will order f/u EKG   Monitor hemodynamic closely

## 2022-06-24 NOTE — H&P PST ADULT - FALL HARM RISK - UNIVERSAL INTERVENTIONS
Bed in lowest position, wheels locked, appropriate side rails in place/Call bell, personal items and telephone in reach/Instruct patient to call for assistance before getting out of bed or chair/Non-slip footwear when patient is out of bed/[place] to call system/Physically safe environment - no spills, clutter or unnecessary equipment/Purposeful Proactive Rounding/Room/bathroom lighting operational, light cord in reach

## 2022-06-24 NOTE — H&P PST ADULT - PROBLEM SELECTOR PLAN 4
Chronic, stable   Continue home medications of Metoprolol Succinate 25mg BID and continue Lisinopril 20mg for therapeutic interchange for Ramipril 5mg w/ hold parameters  Monitor routine hemodynamics

## 2022-06-24 NOTE — H&P PST ADULT - NSANTHOSAYNRD_GEN_A_CORE
No. MEENU screening performed.  STOP BANG Legend: 0-2 = LOW Risk; 3-4 = INTERMEDIATE Risk; 5-8 = HIGH Risk

## 2022-06-24 NOTE — H&P PST ADULT - PROBLEM SELECTOR PLAN 3
Chronic, stable   Well controlled with diet and exercise; not on medication   Check fingerstick, blood glucose levels qAc and qhs, hypoglycemia protocol  Follow up HgbA1c   Start carb consistent diet w/ evening snack

## 2022-07-03 ENCOUNTER — OUTPATIENT (OUTPATIENT)
Dept: OUTPATIENT SERVICES | Facility: HOSPITAL | Age: 67
LOS: 1 days | End: 2022-07-03
Payer: COMMERCIAL

## 2022-07-03 DIAGNOSIS — Z20.828 CONTACT WITH AND (SUSPECTED) EXPOSURE TO OTHER VIRAL COMMUNICABLE DISEASES: ICD-10-CM

## 2022-07-03 DIAGNOSIS — Z98.890 OTHER SPECIFIED POSTPROCEDURAL STATES: Chronic | ICD-10-CM

## 2022-07-03 LAB — SARS-COV-2 RNA SPEC QL NAA+PROBE: SIGNIFICANT CHANGE UP

## 2022-07-03 PROCEDURE — U0005: CPT

## 2022-07-03 PROCEDURE — U0003: CPT

## 2022-07-18 ENCOUNTER — OUTPATIENT (OUTPATIENT)
Dept: OUTPATIENT SERVICES | Facility: HOSPITAL | Age: 67
LOS: 1 days | End: 2022-07-18
Payer: COMMERCIAL

## 2022-07-18 DIAGNOSIS — Z98.890 OTHER SPECIFIED POSTPROCEDURAL STATES: Chronic | ICD-10-CM

## 2022-07-18 DIAGNOSIS — Z20.828 CONTACT WITH AND (SUSPECTED) EXPOSURE TO OTHER VIRAL COMMUNICABLE DISEASES: ICD-10-CM

## 2022-07-18 LAB — SARS-COV-2 RNA SPEC QL NAA+PROBE: SIGNIFICANT CHANGE UP

## 2022-07-18 PROCEDURE — U0005: CPT

## 2022-07-18 PROCEDURE — U0003: CPT

## 2022-07-19 ENCOUNTER — TRANSCRIPTION ENCOUNTER (OUTPATIENT)
Age: 67
End: 2022-07-19

## 2022-07-19 NOTE — ASU PATIENT PROFILE, ADULT - NS SC CAGE ALCOHOL ANNOYED YOU

## 2022-07-19 NOTE — ASU PATIENT PROFILE, ADULT - FALL HARM RISK - CONCLUSION
History of Presenting Problem: Philip Valles is a 33 yo M with PMH substance abuse, Schizoaffective disorder, ADHD, Asperger's, schizophrenia who was admitted for SI and hallucinations. He denies HI. Tox screen positive for THC. EtOH and Rapid COVID both negative. He reports chronic back pain but no new complaints today. SW made contact with patient as he engaged with peers in the milieu. Patient continues to address feelings regarding anxiety and depression. He reports the symptoms are diminishing. Patient is encouraged to comply with treatment plan. SW praised patient for progressing towards goals. SW will continue with supportive counseling and will assist as needed.     JESSENIA CarltonE    Universal Safety Interventions

## 2022-07-20 ENCOUNTER — OUTPATIENT (OUTPATIENT)
Dept: OUTPATIENT SERVICES | Facility: HOSPITAL | Age: 67
LOS: 1 days | End: 2022-07-20
Payer: COMMERCIAL

## 2022-07-20 ENCOUNTER — TRANSCRIPTION ENCOUNTER (OUTPATIENT)
Age: 67
End: 2022-07-20

## 2022-07-20 VITALS
SYSTOLIC BLOOD PRESSURE: 151 MMHG | HEIGHT: 66 IN | WEIGHT: 162.04 LBS | OXYGEN SATURATION: 98 % | RESPIRATION RATE: 14 BRPM | TEMPERATURE: 99 F | DIASTOLIC BLOOD PRESSURE: 80 MMHG | HEART RATE: 59 BPM

## 2022-07-20 VITALS
OXYGEN SATURATION: 95 % | HEART RATE: 56 BPM | SYSTOLIC BLOOD PRESSURE: 122 MMHG | TEMPERATURE: 98 F | DIASTOLIC BLOOD PRESSURE: 66 MMHG | RESPIRATION RATE: 12 BRPM

## 2022-07-20 DIAGNOSIS — H69.91 UNSPECIFIED EUSTACHIAN TUBE DISORDER, RIGHT EAR: ICD-10-CM

## 2022-07-20 DIAGNOSIS — Z98.890 OTHER SPECIFIED POSTPROCEDURAL STATES: Chronic | ICD-10-CM

## 2022-07-20 PROCEDURE — C1889: CPT

## 2022-07-20 PROCEDURE — 69436 CREATE EARDRUM OPENING: CPT | Mod: RT

## 2022-07-20 PROCEDURE — 82962 GLUCOSE BLOOD TEST: CPT

## 2022-07-20 DEVICE — SILICON MOD.T TUB: Type: IMPLANTABLE DEVICE | Status: FUNCTIONAL

## 2022-07-20 RX ORDER — SODIUM CHLORIDE 9 MG/ML
1000 INJECTION, SOLUTION INTRAVENOUS
Refills: 0 | Status: DISCONTINUED | OUTPATIENT
Start: 2022-07-20 | End: 2022-07-20

## 2022-07-20 RX ORDER — METOPROLOL TARTRATE 50 MG
1 TABLET ORAL
Qty: 0 | Refills: 0 | DISCHARGE

## 2022-07-20 RX ORDER — HYDROMORPHONE HYDROCHLORIDE 2 MG/ML
0.5 INJECTION INTRAMUSCULAR; INTRAVENOUS; SUBCUTANEOUS
Refills: 0 | Status: DISCONTINUED | OUTPATIENT
Start: 2022-07-20 | End: 2022-07-20

## 2022-07-20 RX ORDER — TAMSULOSIN HYDROCHLORIDE 0.4 MG/1
1 CAPSULE ORAL
Qty: 0 | Refills: 0 | DISCHARGE

## 2022-07-20 RX ORDER — OXYCODONE HYDROCHLORIDE 5 MG/1
5 TABLET ORAL ONCE
Refills: 0 | Status: DISCONTINUED | OUTPATIENT
Start: 2022-07-20 | End: 2022-07-20

## 2022-07-20 RX ORDER — ATORVASTATIN CALCIUM 80 MG/1
40 TABLET, FILM COATED ORAL
Qty: 0 | Refills: 0 | DISCHARGE

## 2022-07-20 RX ORDER — RAMIPRIL 5 MG
5 CAPSULE ORAL
Qty: 0 | Refills: 0 | DISCHARGE

## 2022-07-20 RX ADMIN — SODIUM CHLORIDE 75 MILLILITER(S): 9 INJECTION, SOLUTION INTRAVENOUS at 08:21

## 2022-07-20 NOTE — ASU DISCHARGE PLAN (ADULT/PEDIATRIC) - NS MD DC FALL RISK RISK
For information on Fall & Injury Prevention, visit: [email or web address] OR  [email or web address] OR  https://www.cdc.gov/steadi/patient.html

## 2022-07-20 NOTE — ASU DISCHARGE PLAN (ADULT/PEDIATRIC) - CARE PROVIDER_API CALL
Gianluca Mohan)  Otolaryngology  [address] [place] [address]  Phone: [phone]  Fax: [phone]  Follow Up Time:

## 2022-10-06 NOTE — PATIENT PROFILE ADULT. - AS SC BRADEN MOISTURE
(4) rarely moist Tazorac Pregnancy And Lactation Text: This medication is not safe during pregnancy. It is unknown if this medication is excreted in breast milk.

## 2023-02-16 NOTE — H&P PST ADULT - HISTORY OF PRESENT ILLNESS
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 Now with c/o in the right ear. Scheduled for Tympanostomy and T-tube right ear on 7/6/22. Pre op testing today.   66 y/o male with hearing problem started in the left year 25 yrs ago, had several surgeries to prevent deafness, however has no eardrum/ perforation in the left ear. Not able to hear with left ear. Now with c/o in the right ear. Seen by Dr Mohan. Scheduled for Tympanostomy and T-tube right ear on 7/6/22. Pre op testing today.

## 2025-04-02 NOTE — H&P ADULT - PSH
Detail Level: Generalized Detail Level: Zone No significant past surgical history Detail Level: Simple
